# Patient Record
Sex: FEMALE | Race: WHITE | Employment: FULL TIME | URBAN - METROPOLITAN AREA
[De-identification: names, ages, dates, MRNs, and addresses within clinical notes are randomized per-mention and may not be internally consistent; named-entity substitution may affect disease eponyms.]

---

## 2022-02-25 ENCOUNTER — APPOINTMENT (OUTPATIENT)
Dept: ULTRASOUND IMAGING | Age: 37
End: 2022-02-25
Attending: PHYSICIAN ASSISTANT
Payer: MEDICAID

## 2022-02-25 ENCOUNTER — HOSPITAL ENCOUNTER (EMERGENCY)
Age: 37
Discharge: HOME | End: 2022-02-25
Attending: EMERGENCY MEDICINE
Payer: MEDICAID

## 2022-02-25 VITALS
BODY MASS INDEX: 30.46 KG/M2 | HEIGHT: 68 IN | OXYGEN SATURATION: 100 % | HEART RATE: 70 BPM | DIASTOLIC BLOOD PRESSURE: 64 MMHG | WEIGHT: 201 LBS | SYSTOLIC BLOOD PRESSURE: 102 MMHG | TEMPERATURE: 98.8 F | RESPIRATION RATE: 16 BRPM

## 2022-02-25 DIAGNOSIS — O20.0 THREATENED ABORTION: Primary | ICD-10-CM

## 2022-02-25 LAB
ABO + RH BLD: NORMAL
ALBUMIN SERPL-MCNC: 3.3 G/DL (ref 3.5–5)
ALBUMIN/GLOB SERPL: 0.8 {RATIO} (ref 1.2–3.5)
ALP SERPL-CCNC: 66 U/L (ref 50–130)
ALT SERPL-CCNC: 19 U/L (ref 12–65)
ANION GAP SERPL CALC-SCNC: 8 MMOL/L (ref 7–16)
AST SERPL-CCNC: 9 U/L (ref 15–37)
BACTERIA URNS QL MICRO: 0 /HPF
BASOPHILS # BLD: 0 K/UL (ref 0–0.2)
BASOPHILS NFR BLD: 0 % (ref 0–2)
BILIRUB SERPL-MCNC: 0.4 MG/DL (ref 0.2–1.1)
BUN SERPL-MCNC: 7 MG/DL (ref 6–23)
CALCIUM SERPL-MCNC: 8.9 MG/DL (ref 8.3–10.4)
CASTS URNS QL MICRO: 0 /LPF
CHLORIDE SERPL-SCNC: 105 MMOL/L (ref 98–107)
CO2 SERPL-SCNC: 25 MMOL/L (ref 21–32)
CREAT SERPL-MCNC: 0.66 MG/DL (ref 0.6–1)
DIFFERENTIAL METHOD BLD: ABNORMAL
EOSINOPHIL # BLD: 0.1 K/UL (ref 0–0.8)
EOSINOPHIL NFR BLD: 1 % (ref 0.5–7.8)
EPI CELLS #/AREA URNS HPF: NORMAL /HPF
ERYTHROCYTE [DISTWIDTH] IN BLOOD BY AUTOMATED COUNT: 12.9 % (ref 11.9–14.6)
GLOBULIN SER CALC-MCNC: 4 G/DL (ref 2.3–3.5)
GLUCOSE SERPL-MCNC: 88 MG/DL (ref 65–100)
HCG SERPL-ACNC: ABNORMAL MIU/ML (ref 0–6)
HCT VFR BLD AUTO: 38.7 % (ref 35.8–46.3)
HGB BLD-MCNC: 12.8 G/DL (ref 11.7–15.4)
IMM GRANULOCYTES # BLD AUTO: 0 K/UL (ref 0–0.5)
IMM GRANULOCYTES NFR BLD AUTO: 0 % (ref 0–5)
LYMPHOCYTES # BLD: 1.4 K/UL (ref 0.5–4.6)
LYMPHOCYTES NFR BLD: 17 % (ref 13–44)
MCH RBC QN AUTO: 30.5 PG (ref 26.1–32.9)
MCHC RBC AUTO-ENTMCNC: 33.1 G/DL (ref 31.4–35)
MCV RBC AUTO: 92.1 FL (ref 79.6–97.8)
MONOCYTES # BLD: 0.4 K/UL (ref 0.1–1.3)
MONOCYTES NFR BLD: 5 % (ref 4–12)
NEUTS SEG # BLD: 6.1 K/UL (ref 1.7–8.2)
NEUTS SEG NFR BLD: 77 % (ref 43–78)
NRBC # BLD: 0 K/UL (ref 0–0.2)
PLATELET # BLD AUTO: 287 K/UL (ref 150–450)
PMV BLD AUTO: 9.1 FL (ref 9.4–12.3)
POTASSIUM SERPL-SCNC: 3.5 MMOL/L (ref 3.5–5.1)
PROT SERPL-MCNC: 7.3 G/DL (ref 6.3–8.2)
RBC # BLD AUTO: 4.2 M/UL (ref 4.05–5.2)
RBC #/AREA URNS HPF: NORMAL /HPF
SERVICE CMNT-IMP: NORMAL
SODIUM SERPL-SCNC: 138 MMOL/L (ref 136–145)
WBC # BLD AUTO: 7.9 K/UL (ref 4.3–11.1)
WBC URNS QL MICRO: NORMAL /HPF
WET PREP GENITAL: NORMAL
WET PREP GENITAL: NORMAL

## 2022-02-25 PROCEDURE — 76817 TRANSVAGINAL US OBSTETRIC: CPT

## 2022-02-25 PROCEDURE — 87210 SMEAR WET MOUNT SALINE/INK: CPT

## 2022-02-25 PROCEDURE — 81015 MICROSCOPIC EXAM OF URINE: CPT

## 2022-02-25 PROCEDURE — 84702 CHORIONIC GONADOTROPIN TEST: CPT

## 2022-02-25 PROCEDURE — 85025 COMPLETE CBC W/AUTO DIFF WBC: CPT

## 2022-02-25 PROCEDURE — 80053 COMPREHEN METABOLIC PANEL: CPT

## 2022-02-25 PROCEDURE — 86900 BLOOD TYPING SEROLOGIC ABO: CPT

## 2022-02-25 PROCEDURE — 99284 EMERGENCY DEPT VISIT MOD MDM: CPT

## 2022-02-25 NOTE — DISCHARGE INSTRUCTIONS
Please follow-up with the OB/GYN, you will need a repeat quantitative hCG test in 2-3 days. Return for any emergent symptoms, severe worsening pelvic or abdominal pain, severe bleeding or any other emergent concerns.

## 2022-02-25 NOTE — ED PROVIDER NOTES
59-year-old female patient comes in today for evaluation of abnormal vaginal discharge which appears like \"old period blood\" For the past 2 days. Patient reports that she is approximately 9 weeks gestation with a twin pregnancy. She has not been seen by her OB/GYN yet but does have an appointment scheduled for next month. She also reports pressure in the right lower and right mid suprapubic region of her abdomen. She denies \"pain\" but says that it is mildly uncomfortable. She denies nausea vomiting or diarrhea or urinary symptoms. History reviewed. No pertinent past medical history. Past Surgical History:   Procedure Laterality Date    IR CHOLECYSTOSTOMY PERCUTANEOUS           History reviewed. No pertinent family history. Social History     Socioeconomic History    Marital status: SINGLE     Spouse name: Not on file    Number of children: Not on file    Years of education: Not on file    Highest education level: Not on file   Occupational History    Not on file   Tobacco Use    Smoking status: Not on file    Smokeless tobacco: Not on file   Substance and Sexual Activity    Alcohol use: Not on file    Drug use: Not on file    Sexual activity: Not on file   Other Topics Concern    Not on file   Social History Narrative    Not on file     Social Determinants of Health     Financial Resource Strain:     Difficulty of Paying Living Expenses: Not on file   Food Insecurity:     Worried About Running Out of Food in the Last Year: Not on file    Seth of Food in the Last Year: Not on file   Transportation Needs:     Lack of Transportation (Medical): Not on file    Lack of Transportation (Non-Medical):  Not on file   Physical Activity:     Days of Exercise per Week: Not on file    Minutes of Exercise per Session: Not on file   Stress:     Feeling of Stress : Not on file   Social Connections:     Frequency of Communication with Friends and Family: Not on file    Frequency of Social Gatherings with Friends and Family: Not on file    Attends [employer] Services: Not on file    Active Member of Clubs or Organizations: Not on file    Attends Club or Organization Meetings: Not on file    Marital Status: Not on file   Intimate Partner Violence:     Fear of Current or Ex-Partner: Not on file    Emotionally Abused: Not on file    Physically Abused: Not on file    Sexually Abused: Not on file   Housing Stability:     Unable to Pay for Housing in the Last Year: Not on file    Number of [place] in the Last Year: Not on file    Unstable Housing in the Last Year: Not on file         ALLERGIES: Latex    Review of Systems   Constitutional: Negative for chills and fever. Respiratory: Negative for cough and shortness of breath. Cardiovascular: Negative for chest pain. Gastrointestinal: Negative for abdominal pain, nausea and vomiting. Genitourinary: Positive for menstrual problem, vaginal bleeding and vaginal discharge. Negative for dysuria, frequency, urgency and vaginal pain. Musculoskeletal: Negative for back pain and myalgias. Skin: Negative for color change. All other systems reviewed and are negative. Vitals:    02/25/22 1317   BP: (!) 145/66   Pulse: 96   Resp: 18   Temp: 98.8 °F (37.1 °C)   SpO2: 99%   Weight: 91.2 kg (201 lb)   Height: 5' 8\" (1.727 m)            Physical Exam  Vitals and nursing note reviewed. Exam conducted with a chaperone present. Constitutional:       General: She is not in acute distress. Appearance: Normal appearance. She is not ill-appearing, toxic-appearing or diaphoretic. HENT:      Head: Normocephalic and atraumatic. Eyes:      Conjunctiva/sclera: Conjunctivae normal.   Cardiovascular:      Rate and Rhythm: Normal rate and regular rhythm. Pulses: Normal pulses. Pulmonary:      Effort: Pulmonary effort is normal. No respiratory distress. Breath sounds: Normal air entry.  No stridor, decreased air movement or transmitted upper airway sounds. No decreased breath sounds. Abdominal:      General: Abdomen is flat. Palpations: Abdomen is soft. Tenderness: There is no abdominal tenderness. There is no right CVA tenderness, left CVA tenderness, guarding or rebound. Genitourinary:     Vagina: Vaginal discharge present. No erythema, tenderness, bleeding or lesions. Cervix: Discharge present. No cervical motion tenderness, friability, lesion, erythema, cervical bleeding or eversion. Uterus: Normal.       Adnexa: Right adnexa normal and left adnexa normal.      Comments: White discharge present, no blood in the vaginal canal noted. Cervical os is closed. Nurse Jennifer chaperone present. Skin:     General: Skin is warm and dry. Neurological:      General: No focal deficit present. Mental Status: She is alert and oriented to person, place, and time. Mental status is at baseline. MDM  Number of Diagnoses or Management Options  Threatened : new and requires workup  Diagnosis management comments: 80-year-old female comes in with concern for a dark brown discharge in a small quantity which started yesterday. She is approximately 9-1/2 weeks gestation with a twin pregnancy. She has an appointment with her OB/GYN scheduled for .  Labs show normal WBC, H&H and platelets. Normal electrolytes, kidney function, liver function. Quantitative hCG is 175,628. I performed a wet prep given the vaginal discharge and this came back negative. Patient's urine negative for infection. OB ultrasound shows intrauterine twin gestations with positive fetal heart tones. Patient will be discharged and advised to follow-up closely with the OB/GYN. Will need a repeat quantitative hCG in 2 to 3 days. Advised to return for any emergent or severely worsening symptoms.     ED Course as of 22 1609      1601   IMPRESSION  Intrauterine dichorionic/diamniotic twin gestations with visualized fetal heart  rates, estimated at approximately 10 weeks.  [AR]      ED Course User Index  [AR] [facility]       Procedures

## 2022-02-25 NOTE — ED TRIAGE NOTES
Pt states she is about 9 weeks pregnant, states she started having a brown colored discharge yesterday. Also states she feels \"pressure\" in RLQ. Mask applied to pt.

## 2022-02-25 NOTE — ED NOTES
I have reviewed discharge instructions with the patient. The patient verbalized understanding. Patient left ED via Discharge Method: ambulatory to Home with self. Opportunity for questions and clarification provided. No acute distress present      Patient given 0 scripts. To continue your aftercare when you leave the hospital, you may receive an automated call from our care team to check in on how you are doing. This is a free service and part of our promise to provide the best care and service to meet your aftercare needs.  If you have questions, or wish to unsubscribe from this service please call [phone]. Thank you for Choosing our [facility] Emergency Department.

## 2022-03-09 PROBLEM — O20.0 THREATENED ABORTION: Status: RESOLVED | Noted: 2022-02-25 | Resolved: 2022-03-09

## 2022-03-09 PROBLEM — O99.331 SMOKING (TOBACCO) COMPLICATING PREGNANCY, FIRST TRIMESTER: Status: ACTIVE | Noted: 2022-03-09

## 2022-03-09 PROBLEM — O30.041 DICHORIONIC DIAMNIOTIC TWIN PREGNANCY IN FIRST TRIMESTER: Status: ACTIVE | Noted: 2022-03-09

## 2022-03-09 PROBLEM — O09.521 AMA (ADVANCED MATERNAL AGE) MULTIGRAVIDA 35+, FIRST TRIMESTER: Status: ACTIVE | Noted: 2022-03-09

## 2022-03-18 PROBLEM — O99.331 SMOKING (TOBACCO) COMPLICATING PREGNANCY, FIRST TRIMESTER: Status: ACTIVE | Noted: 2022-03-09

## 2022-03-19 PROBLEM — O09.521 AMA (ADVANCED MATERNAL AGE) MULTIGRAVIDA 35+, FIRST TRIMESTER: Status: ACTIVE | Noted: 2022-03-09

## 2022-03-19 PROBLEM — O30.041 DICHORIONIC DIAMNIOTIC TWIN PREGNANCY IN FIRST TRIMESTER: Status: ACTIVE | Noted: 2022-03-09

## 2022-03-21 PROBLEM — O99.211 OBESITY AFFECTING PREGNANCY IN FIRST TRIMESTER: Status: ACTIVE | Noted: 2022-03-21

## 2022-03-23 ENCOUNTER — APPOINTMENT (OUTPATIENT)
Dept: GENERAL RADIOLOGY | Age: 37
End: 2022-03-23
Attending: PHYSICIAN ASSISTANT
Payer: COMMERCIAL

## 2022-03-23 ENCOUNTER — HOSPITAL ENCOUNTER (EMERGENCY)
Age: 37
Discharge: HOME | End: 2022-03-23
Attending: EMERGENCY MEDICINE
Payer: COMMERCIAL

## 2022-03-23 VITALS
SYSTOLIC BLOOD PRESSURE: 148 MMHG | HEIGHT: 67 IN | BODY MASS INDEX: 31.86 KG/M2 | RESPIRATION RATE: 16 BRPM | DIASTOLIC BLOOD PRESSURE: 97 MMHG | HEART RATE: 77 BPM | TEMPERATURE: 98.9 F | OXYGEN SATURATION: 99 % | WEIGHT: 203 LBS

## 2022-03-23 DIAGNOSIS — S93.401A SPRAIN OF RIGHT ANKLE, UNSPECIFIED LIGAMENT, INITIAL ENCOUNTER: Primary | ICD-10-CM

## 2022-03-23 PROBLEM — F41.9 ANXIETY: Status: ACTIVE | Noted: 2022-03-23

## 2022-03-23 PROBLEM — O99.340 ANXIETY DURING PREGNANCY: Status: ACTIVE | Noted: 2022-03-23

## 2022-03-23 PROCEDURE — 73610 X-RAY EXAM OF ANKLE: CPT

## 2022-03-23 PROCEDURE — 99283 EMERGENCY DEPT VISIT LOW MDM: CPT

## 2022-03-23 NOTE — DISCHARGE INSTRUCTIONS
As we discussed, your x-ray is negative for any acute fractures. Continue to use ice, compression with an Ace bandage, elevate the extremity. You may use Tylenol as needed for pain. If pain is no better in 2 to 3 weeks, follow-up with orthopedics. Their information is on your discharge paperwork.

## 2022-03-23 NOTE — ED PROVIDER NOTES
44-year-old female, 13 weeks pregnant presents with right ankle pain. She states while stepping off of a curb in [place], she rolled her ankle. She went to her OB office who referred her to the ED for an x-ray of her right ankle. She is able to bear some weight however with significant amount of pain. Pain radiates up to her proximal and mid shin. Associate with significant bruising and swelling of the right ankle. She is taken Tylenol and ice, Ace wrap with some improvement of her symptoms.   No other medical complaints           Past Medical History:   Diagnosis Date    Hypothyroidism     in 2000 was on synthroid was told it was stress induces        Past Surgical History:   Procedure Laterality Date    HX CHOLECYSTECTOMY      IR CHOLECYSTOSTOMY PERCUTANEOUS           Family History:   Problem Relation Age of Onset    Diabetes Mother     Diabetes Father     Cancer Maternal Grandmother     Mult Sclerosis Paternal Grandmother     Breast Cancer Neg Hx     Ovarian Cancer Neg Hx     Uterine Cancer Neg Hx     Colon Cancer Neg Hx        Social History     Socioeconomic History    Marital status:      Spouse name: Not on file    Number of children: Not on file    Years of education: Not on file    Highest education level: Not on file   Occupational History    Not on file   Tobacco Use    Smoking status: Current Every Day Smoker     Packs/day: 0.25     Types: Cigarettes    Smokeless tobacco: Never Used    Tobacco comment: patient is vaping daily    Vaping Use    Vaping Use: Every day    Substances: Nicotine   Substance and Sexual Activity    Alcohol use: Not Currently    Drug use: Not Currently    Sexual activity: Yes     Birth control/protection: None   Other Topics Concern    Not on file   Social History Narrative    Abuse: Feels safe at home, no history of physical abuse, no history of sexual abuse      Social Determinants of Health     Financial Resource Strain:     Difficulty of Paying Living Expenses: Not on file   Food Insecurity:     Worried About [address] in the Last Year: Not on file    Ran Out of Food in the Last Year: Not on file   Transportation Needs:     Lack of Transportation (Medical): Not on file    Lack of Transportation (Non-Medical): Not on file   Physical Activity:     Days of Exercise per Week: Not on file    Minutes of Exercise per Session: Not on file   Stress:     Feeling of Stress : Not on file   Social Connections:     Frequency of Communication with Friends and Family: Not on file    Frequency of Social Gatherings with Friends and Family: Not on file    Attends [employer] Services: Not on file    Active Member of [address] [place] [address] or Organizations: Not on file    Attends Club or Organization Meetings: Not on file    Marital Status: Not on file   Intimate Partner Violence:     Fear of Current or Ex-Partner: Not on file    Emotionally Abused: Not on file    Physically Abused: Not on file    Sexually Abused: Not on file   Housing Stability:     Unable to Pay for Housing in the Last Year: Not on file    Number of [place] in the Last Year: Not on file    Unstable Housing in the Last Year: Not on file         ALLERGIES: Latex, Banana, and Josue    Review of Systems   Constitutional: Negative for chills and fever. HENT: Negative for sore throat. Respiratory: Negative for cough. Gastrointestinal: Negative for nausea and vomiting. Musculoskeletal: Positive for arthralgias. Neurological: Negative for dizziness. Psychiatric/Behavioral: Negative for agitation. All other systems reviewed and are negative. Vitals:    03/23/22 1635   BP: (!) 148/97   Pulse: 77   Resp: 16   Temp: 98.9 °F (37.2 °C)   SpO2: 99%   Weight: 92.1 kg (203 lb)   Height: 5' 7\" (1.702 m)            Physical Exam  Vitals and nursing note reviewed. Constitutional:       General: She is not in acute distress. Appearance: Normal appearance.  She is normal weight. She is not ill-appearing, toxic-appearing or diaphoretic. HENT:      Head: Normocephalic and atraumatic. Nose: Nose normal.      Mouth/Throat:      Mouth: Mucous membranes are moist.   Eyes:      Pupils: Pupils are equal, round, and reactive to light. Cardiovascular:      Pulses: Normal pulses. Abdominal:      Palpations: Abdomen is soft. Musculoskeletal:      Right ankle: No swelling, deformity or ecchymosis. Tenderness present. Left ankle: Normal.   Neurological:      Mental Status: She is alert. MDM  Number of Diagnoses or Management Options  Sprain of right ankle, unspecified ligament, initial encounter  Diagnosis management comments: Patient presented with right ankle pain after stepping off a curb wrong yesterday. She is 15 weeks pregnant. Use some ice at home however was referred here by her OB/GYN for x-ray. X-ray obtained here in department was negative for any visualized fracture. Advised rest, ice, compression and elevation at home. Patient verbalized understanding. Voice dictation software was used during the making of this note. This software is not perfect and grammatical and other typographical errors may be present. This note has been proofread, but may still contain errors.    Birgit Hall PA-C        Amount and/or Complexity of Data Reviewed  Tests in the radiology section of CPT®: ordered and reviewed    Risk of Complications, Morbidity, and/or Mortality  Presenting problems: low  Diagnostic procedures: low  Management options: low    Patient Progress  Patient progress: stable         Procedures

## 2022-03-23 NOTE — ED TRIAGE NOTES
Pt presents to ED c/o right ankle pain and swelling. Pt states she rolled her ankle yesterday. No deformities noted upon triage exam. Pt is 13 weeks pregnant. Masked.

## 2022-03-23 NOTE — ED NOTES
I have reviewed discharge instructions with the patient. The patient verbalized understanding. Patient left ED via Discharge Method: ambulatory to Home with . Opportunity for questions and clarification provided. Patient given 0 scripts. To continue your aftercare when you leave the hospital, you may receive an automated call from our care team to check in on how you are doing. This is a free service and part of our promise to provide the best care and service to meet your aftercare needs.  If you have questions, or wish to unsubscribe from this service please call [phone]. Thank you for Choosing our [facility] Emergency Department.

## 2022-03-24 PROBLEM — O99.340 ANXIETY DURING PREGNANCY: Status: ACTIVE | Noted: 2022-03-23

## 2022-03-24 PROBLEM — F41.9 ANXIETY: Status: ACTIVE | Noted: 2022-03-23

## 2022-03-24 PROBLEM — F41.9 ANXIETY DURING PREGNANCY: Status: ACTIVE | Noted: 2022-03-23

## 2022-03-24 PROBLEM — O99.211 OBESITY AFFECTING PREGNANCY IN FIRST TRIMESTER: Status: ACTIVE | Noted: 2022-03-21

## 2022-04-07 PROBLEM — O20.9 VAGINAL BLEEDING AFFECTING EARLY PREGNANCY: Status: ACTIVE | Noted: 2022-04-07

## 2022-04-07 PROBLEM — O30.042 DICHORIONIC DIAMNIOTIC TWIN PREGNANCY IN SECOND TRIMESTER: Status: ACTIVE | Noted: 2022-03-09

## 2022-04-11 PROBLEM — O20.9 VAGINAL BLEEDING AFFECTING EARLY PREGNANCY: Status: ACTIVE | Noted: 2022-04-07

## 2022-04-11 PROBLEM — O30.042 DICHORIONIC DIAMNIOTIC TWIN PREGNANCY IN SECOND TRIMESTER: Status: ACTIVE | Noted: 2022-03-09

## 2022-04-18 PROBLEM — O09.521 AMA (ADVANCED MATERNAL AGE) MULTIGRAVIDA 35+, FIRST TRIMESTER: Status: RESOLVED | Noted: 2022-03-09 | Resolved: 2022-04-18

## 2022-04-19 PROBLEM — O99.332 SMOKING (TOBACCO) COMPLICATING PREGNANCY, SECOND TRIMESTER: Status: ACTIVE | Noted: 2022-03-09

## 2022-04-19 PROBLEM — O09.522 AMA (ADVANCED MATERNAL AGE) MULTIGRAVIDA 35+, SECOND TRIMESTER: Status: ACTIVE | Noted: 2022-03-09

## 2022-04-19 PROBLEM — O99.212 OBESITY AFFECTING PREGNANCY IN SECOND TRIMESTER: Status: ACTIVE | Noted: 2022-03-21

## 2022-04-23 ENCOUNTER — HOSPITAL ENCOUNTER (EMERGENCY)
Age: 37
Discharge: HOME | End: 2022-04-23
Attending: OBSTETRICS & GYNECOLOGY | Admitting: OBSTETRICS & GYNECOLOGY
Payer: COMMERCIAL

## 2022-04-23 ENCOUNTER — HOSPITAL ENCOUNTER (OUTPATIENT)
Age: 37
Setting detail: OBSERVATION
Discharge: HOME | End: 2022-04-26
Attending: OBSTETRICS & GYNECOLOGY | Admitting: OBSTETRICS & GYNECOLOGY
Payer: COMMERCIAL

## 2022-04-23 VITALS
WEIGHT: 201 LBS | RESPIRATION RATE: 18 BRPM | BODY MASS INDEX: 30.46 KG/M2 | TEMPERATURE: 98.4 F | HEIGHT: 68 IN | HEART RATE: 86 BPM | OXYGEN SATURATION: 98 % | SYSTOLIC BLOOD PRESSURE: 92 MMHG | DIASTOLIC BLOOD PRESSURE: 52 MMHG

## 2022-04-23 DIAGNOSIS — O09.522 AMA (ADVANCED MATERNAL AGE) MULTIGRAVIDA 35+, SECOND TRIMESTER: ICD-10-CM

## 2022-04-23 DIAGNOSIS — O30.042 DICHORIONIC DIAMNIOTIC TWIN PREGNANCY IN SECOND TRIMESTER: ICD-10-CM

## 2022-04-23 DIAGNOSIS — O46.92 VAGINAL BLEEDING IN PREGNANCY, SECOND TRIMESTER: ICD-10-CM

## 2022-04-23 PROBLEM — K59.00 CONSTIPATION DURING PREGNANCY IN SECOND TRIMESTER: Status: ACTIVE | Noted: 2022-04-23

## 2022-04-23 PROBLEM — O99.612 CONSTIPATION DURING PREGNANCY IN SECOND TRIMESTER: Status: ACTIVE | Noted: 2022-04-23

## 2022-04-23 LAB
ABO + RH BLD: NORMAL
ANION GAP SERPL CALC-SCNC: 8 MMOL/L (ref 7–16)
APTT PPP: 25.8 SEC (ref 24.1–35.1)
BASOPHILS # BLD: 0 K/UL (ref 0–0.2)
BASOPHILS # BLD: 0 K/UL (ref 0–0.2)
BASOPHILS NFR BLD: 0 % (ref 0–2)
BASOPHILS NFR BLD: 0 % (ref 0–2)
BUN SERPL-MCNC: 8 MG/DL (ref 6–23)
CALCIUM SERPL-MCNC: 8.7 MG/DL (ref 8.3–10.4)
CHLORIDE SERPL-SCNC: 109 MMOL/L (ref 98–107)
CO2 SERPL-SCNC: 24 MMOL/L (ref 21–32)
CREAT SERPL-MCNC: 0.53 MG/DL (ref 0.6–1)
DIFFERENTIAL METHOD BLD: ABNORMAL
DIFFERENTIAL METHOD BLD: ABNORMAL
EOSINOPHIL # BLD: 0.2 K/UL (ref 0–0.8)
EOSINOPHIL # BLD: 0.2 K/UL (ref 0–0.8)
EOSINOPHIL NFR BLD: 2 % (ref 0.5–7.8)
EOSINOPHIL NFR BLD: 2 % (ref 0.5–7.8)
ERYTHROCYTE [DISTWIDTH] IN BLOOD BY AUTOMATED COUNT: 12.9 % (ref 11.9–14.6)
ERYTHROCYTE [DISTWIDTH] IN BLOOD BY AUTOMATED COUNT: 12.9 % (ref 11.9–14.6)
FIBRINOGEN PPP-MCNC: 388 MG/DL (ref 190–501)
GLUCOSE SERPL-MCNC: 92 MG/DL (ref 65–100)
HCG SERPL-ACNC: ABNORMAL MIU/ML (ref 0–6)
HCT VFR BLD AUTO: 32 % (ref 35.8–46.3)
HCT VFR BLD AUTO: 34.2 % (ref 35.8–46.3)
HGB BLD-MCNC: 10.8 G/DL (ref 11.7–15.4)
HGB BLD-MCNC: 11.5 G/DL (ref 11.7–15.4)
IMM GRANULOCYTES # BLD AUTO: 0.1 K/UL (ref 0–0.5)
IMM GRANULOCYTES # BLD AUTO: 0.1 K/UL (ref 0–0.5)
IMM GRANULOCYTES NFR BLD AUTO: 0 % (ref 0–5)
IMM GRANULOCYTES NFR BLD AUTO: 1 % (ref 0–5)
INR PPP: 0.9
LYMPHOCYTES # BLD: 1.5 K/UL (ref 0.5–4.6)
LYMPHOCYTES # BLD: 2.3 K/UL (ref 0.5–4.6)
LYMPHOCYTES NFR BLD: 14 % (ref 13–44)
LYMPHOCYTES NFR BLD: 20 % (ref 13–44)
MCH RBC QN AUTO: 31.2 PG (ref 26.1–32.9)
MCH RBC QN AUTO: 31.3 PG (ref 26.1–32.9)
MCHC RBC AUTO-ENTMCNC: 33.6 G/DL (ref 31.4–35)
MCHC RBC AUTO-ENTMCNC: 33.8 G/DL (ref 31.4–35)
MCV RBC AUTO: 92.5 FL (ref 79.6–97.8)
MCV RBC AUTO: 92.9 FL (ref 79.6–97.8)
MONOCYTES # BLD: 0.7 K/UL (ref 0.1–1.3)
MONOCYTES # BLD: 0.7 K/UL (ref 0.1–1.3)
MONOCYTES NFR BLD: 6 % (ref 4–12)
MONOCYTES NFR BLD: 7 % (ref 4–12)
NEUTS SEG # BLD: 8 K/UL (ref 1.7–8.2)
NEUTS SEG # BLD: 8.1 K/UL (ref 1.7–8.2)
NEUTS SEG NFR BLD: 71 % (ref 43–78)
NEUTS SEG NFR BLD: 77 % (ref 43–78)
NRBC # BLD: 0 K/UL (ref 0–0.2)
NRBC # BLD: 0 K/UL (ref 0–0.2)
PLATELET # BLD AUTO: 291 K/UL (ref 150–450)
PLATELET # BLD AUTO: 295 K/UL (ref 150–450)
PMV BLD AUTO: 8.9 FL (ref 9.4–12.3)
PMV BLD AUTO: 9.1 FL (ref 9.4–12.3)
POTASSIUM SERPL-SCNC: 3.8 MMOL/L (ref 3.5–5.1)
PROTHROMBIN TIME: 12.5 SEC (ref 12.6–14.5)
RBC # BLD AUTO: 3.46 M/UL (ref 4.05–5.2)
RBC # BLD AUTO: 3.68 M/UL (ref 4.05–5.2)
SODIUM SERPL-SCNC: 141 MMOL/L (ref 136–145)
WBC # BLD AUTO: 10.6 K/UL (ref 4.3–11.1)
WBC # BLD AUTO: 11.2 K/UL (ref 4.3–11.1)

## 2022-04-23 PROCEDURE — 76817 TRANSVAGINAL US OBSTETRIC: CPT

## 2022-04-23 PROCEDURE — 80048 BASIC METABOLIC PNL TOTAL CA: CPT

## 2022-04-23 PROCEDURE — 76815 OB US LIMITED FETUS(S): CPT

## 2022-04-23 PROCEDURE — 85384 FIBRINOGEN ACTIVITY: CPT

## 2022-04-23 PROCEDURE — 85730 THROMBOPLASTIN TIME PARTIAL: CPT

## 2022-04-23 PROCEDURE — 99285 EMERGENCY DEPT VISIT HI MDM: CPT

## 2022-04-23 PROCEDURE — 86900 BLOOD TYPING SEROLOGIC ABO: CPT

## 2022-04-23 PROCEDURE — 87491 CHLMYD TRACH DNA AMP PROBE: CPT

## 2022-04-23 PROCEDURE — 85025 COMPLETE CBC W/AUTO DIFF WBC: CPT

## 2022-04-23 PROCEDURE — 75810000275 HC EMERGENCY DEPT VISIT NO LEVEL OF CARE

## 2022-04-23 PROCEDURE — 85610 PROTHROMBIN TIME: CPT

## 2022-04-23 PROCEDURE — G0378 HOSPITAL OBSERVATION PER HR: HCPCS

## 2022-04-23 PROCEDURE — 99283 EMERGENCY DEPT VISIT LOW MDM: CPT

## 2022-04-23 PROCEDURE — 84702 CHORIONIC GONADOTROPIN TEST: CPT

## 2022-04-23 RX ORDER — SODIUM CHLORIDE 0.9 % (FLUSH) 0.9 %
5-10 SYRINGE (ML) INJECTION AS NEEDED
Status: DISCONTINUED | OUTPATIENT
Start: 2022-04-23 | End: 2022-04-23 | Stop reason: HOSPADM

## 2022-04-23 RX ORDER — POLYETHYLENE GLYCOL 3350 17 G/17G
17 POWDER, FOR SOLUTION ORAL 2 TIMES DAILY
Qty: 1 EACH | Refills: 2 | Status: SHIPPED | OUTPATIENT
Start: 2022-04-23

## 2022-04-23 RX ORDER — ACETAMINOPHEN 325 MG/1
650 TABLET ORAL
Status: DISCONTINUED | OUTPATIENT
Start: 2022-04-23 | End: 2022-04-26 | Stop reason: HOSPADM

## 2022-04-23 RX ORDER — SODIUM CHLORIDE 0.9 % (FLUSH) 0.9 %
5-40 SYRINGE (ML) INJECTION EVERY 8 HOURS
Status: DISCONTINUED | OUTPATIENT
Start: 2022-04-23 | End: 2022-04-26 | Stop reason: HOSPADM

## 2022-04-23 RX ORDER — SODIUM CHLORIDE 0.9 % (FLUSH) 0.9 %
5-10 SYRINGE (ML) INJECTION EVERY 8 HOURS
Status: DISCONTINUED | OUTPATIENT
Start: 2022-04-23 | End: 2022-04-23 | Stop reason: HOSPADM

## 2022-04-23 RX ORDER — SODIUM CHLORIDE 0.9 % (FLUSH) 0.9 %
5-40 SYRINGE (ML) INJECTION AS NEEDED
Status: DISCONTINUED | OUTPATIENT
Start: 2022-04-23 | End: 2022-04-26 | Stop reason: HOSPADM

## 2022-04-23 RX ORDER — GLUCOSAMINE SULFATE 1500 MG
POWDER IN PACKET (EA) ORAL DAILY
COMMUNITY

## 2022-04-23 NOTE — ED TRIAGE NOTES
Pt c/o waking up this morning with small amount of vag bleed. Pt is 18 weeks pregnant with twins. 1 previous pregnancy 9 years ago without complication, full term, vag delivery.

## 2022-04-23 NOTE — DISCHARGE INSTRUCTIONS
Patient Education      Patient Education   Patient Education   Colace daily   Miralax daily     Pregnancy Precautions: Care Instructions  Your Care Instructions     There is no sure way to prevent labor before your due date ( labor) or to prevent most other pregnancy problems. But there are things you can do to increase your chances of a healthy pregnancy. Go to your appointments, follow your doctor's advice, and take good care of yourself. Eat well, and exercise (if your doctor agrees). And make sure to drink plenty of water. Follow-up care is a key part of your treatment and safety. Be sure to make and go to all appointments, and call your doctor if you are having problems. It's also a good idea to know your test results and keep a list of the medicines you take. How can you care for yourself at home? · Make sure you go to your prenatal appointments. At each visit, your doctor will check your blood pressure. Your doctor will also check to see if you have protein in your urine. High blood pressure and protein in urine are signs of preeclampsia. This condition can be dangerous for you and your baby. · Drink plenty of fluids. Dehydration can cause contractions. If you have kidney, heart, or liver disease and have to limit fluids, talk with your doctor before you increase the amount of fluids you drink. · Tell your doctor right away if you notice any symptoms of an infection, such as:  ? Burning when you urinate. ? A foul-smelling discharge from your vagina. ? Vaginal itching. ? Unexplained fever. ? Unusual pain or soreness in your uterus or lower belly. · Eat a balanced diet. Include plenty of foods that are high in calcium and iron. ? Foods high in calcium include milk, cheese, yogurt, almonds, and broccoli. ? Foods high in iron include red meat, shellfish, poultry, eggs, beans, raisins, whole-grain bread, and leafy green vegetables. · Do not smoke.  If you need help quitting, talk to your doctor about stop-smoking programs and medicines. These can increase your chances of quitting for good. · Do not drink alcohol or use marijuana or illegal drugs. · Follow your doctor's directions about activity. Your doctor will let you know how much, if any, exercise you can do. · Ask your doctor if you can have sex. If you are at risk for early labor, your doctor may ask you to not have sex. · Take care to prevent falls. During pregnancy, your joints are loose, and your balance is off. Sports such as bicycling, skiing, or in-line skating can increase your risk of falling. And don't ride horses or motorcycles, dive, water ski, scuba dive, or parachute jump while you are pregnant. · Avoid things that can make your body too hot and may be harmful to your baby, such as a hot tub or sauna. Or talk with your doctor before doing anything that raises your body temperature. Your doctor can tell you if it's safe. · Do not take any over-the-counter or herbal medicines or supplements without talking to your doctor or pharmacist first.  When should you call for help? Call 911  anytime you think you may need emergency care. For example, call if:    · You passed out (lost consciousness).     · You have a seizure.     · You have severe vaginal bleeding.     · You have severe pain in your belly or pelvis.     · You have had fluid gushing or leaking from your vagina and you know or think the umbilical cord is bulging into your vagina. If this happens, immediately get down on your knees so your rear end (buttocks) is higher than your head. This will decrease the pressure on the cord until help arrives. Call your doctor now or seek immediate medical care if:    · You have signs of preeclampsia, such as:  ? Sudden swelling of your face, hands, or feet. ? New vision problems (such as dimness, blurring, or seeing spots).   ? A severe headache.     · You have any vaginal bleeding.     · You have belly pain or cramping.     · You have a fever.     · You have had regular contractions (with or without pain) for an hour. This means that you have 8 or more within 1 hour or 4 or more in 20 minutes after you change your position and drink fluids.     · You have a sudden release of fluid from your vagina.     · You have low back pain or pelvic pressure that does not go away.     · You notice that your baby has stopped moving or is moving much less than normal.   Watch closely for changes in your health, and be sure to contact your doctor if you have any problems. Where can you learn more? Go to http://www.maxwell.com/  Enter Y951 in the search box to learn more about \"Pregnancy Precautions: Care Instructions. \"  Current as of: June 16, 2021               Content Version: 13.2  © 2006-2022 [employer]. Care instructions adapted under license by [employer] (which disclaims liability or warranty for this information). If you have questions about a medical condition or this instruction, always ask your healthcare professional. [place] [address] any warranty or liability for your use of this information. Weeks 18 to 22 of Your Pregnancy: Care Instructions  Overview     Your baby is continuing to develop quickly. Sometime between 18 and 22 weeks, you'll probably start to feel your baby move. At first, these small fetal movements feel like fluttering or \"butterflies. \" Or they may feel like gas bubbles. As your baby grows, these movements will become stronger. You may also notice that your baby hiccups. Babies at this stage can now suck their thumbs. You may find that your nausea and fatigue are gone. You may feel better overall and have more energy than you did in your first trimester. But you might now also have some new discomforts, like sleep problems or leg cramps. Talk to your doctor about things you can do at home to ease these problems.   Follow-up care is a key part of your treatment and safety. Be sure to make and go to all appointments, and call your doctor if you are having problems. It's also a good idea to know your test results and keep a list of the medicines you take. How can you care for yourself at home? Ease sleep problems  · Avoid caffeine in drinks or chocolate late in the day. · Get some exercise every day. · Take a warm shower or bath before bed. · Have a light snack or glass of milk at bedtime. · Do relaxation exercises in bed to calm your mind and body. · Support your legs and back with extra pillows. Try a pillow between your legs if you sleep on your side. · Do not use sleeping pills or alcohol. They could harm your baby. Ease leg cramps  · Do not massage your calf during the cramp. · Sit on a firm bed or chair. Straighten your leg, and bend your foot (flex your ankle) slowly upward, toward your knee. Bend your toes up and down. · Stand on a cool, flat surface. Stretch your toes upward, and take small steps walking on your heels. · Use a heating pad or hot water bottle to help with muscle ache. Prevent leg cramps  · Be sure to get enough calcium. If you are worried that you are not getting enough, talk to your doctor. · Exercise every day, and stretch your legs before bed. · Take a warm bath before bed, and try leg warmers at night. Where can you learn more? Go to http://www.gray.com/  Enter I101 in the search box to learn more about \"Weeks 18 to 22 of Your Pregnancy: Care Instructions. \"  Current as of: June 16, 2021               Content Version: 13.2  © 6794-8322 [employer]. Care instructions adapted under license by [employer] (which disclaims liability or warranty for this information). If you have questions about a medical condition or this instruction, always ask your healthcare professional. [address] any warranty or liability for your use of this information. Constipation: Care Instructions  Overview     Constipation means that you have a hard time passing stools (bowel movements). People pass stools from 3 times a day to once every 3 days. What is normal for you may be different. Constipation may occur with pain in the rectum and cramping. The pain may get worse when you try to pass stools. Sometimes there are small amounts of bright red blood on toilet paper or the surface of stools. This is because of enlarged veins near the rectum (hemorrhoids). A few changes in your diet and lifestyle may help you avoid ongoing constipation. Your doctor may also prescribe medicine to help loosen your stool. Some medicines can cause constipation. These include pain medicines and antidepressants. Tell your doctor about all the medicines you take. Your doctor may want to make a medicine change to ease your symptoms. Follow-up care is a key part of your treatment and safety. Be sure to make and go to all appointments, and call your doctor if you are having problems. It's also a good idea to know your test results and keep a list of the medicines you take. How can you care for yourself at home? · Drink plenty of fluids. If you have kidney, heart, or liver disease and have to limit fluids, talk with your doctor before you increase the amount of fluids you drink. · Include high-fiber foods in your diet each day. These include fruits, vegetables, beans, and whole grains. · Get at least 30 minutes of exercise on most days of the week. Walking is a good choice. You also may want to do other activities, such as running, swimming, cycling, or playing tennis or team sports. · Take a fiber supplement, such as Citrucel or Metamucil, every day. Read and follow all instructions on the label. · Schedule time each day for a bowel movement. A daily routine may help. Take your time having a bowel movement, but don't sit for more than 10 minutes at a time.  And don't strain too much.  · Support your feet with a small step stool when you sit on the toilet. This helps flex your hips and places your pelvis in a squatting position. · Your doctor may recommend an over-the-counter laxative to relieve your constipation. Examples are Milk of Magnesia and MiraLax. Read and follow all instructions on the label. Do not use laxatives on a long-term basis. When should you call for help? Call your doctor now or seek immediate medical care if:    · You have new or worse belly pain.     · You have new or worse nausea or vomiting.     · You have blood in your stools. Watch closely for changes in your health, and be sure to contact your doctor if:    · Your constipation is getting worse.     · You do not get better as expected. Where can you learn more? Go to http://www.maxwell.com/  Enter P343 in the search box to learn more about \"Constipation: Care Instructions. \"  Current as of: July 1, 2021               Content Version: 13.2  © 2006-2022 [employer]. Care instructions adapted under license by [employer] (which disclaims liability or warranty for this information). If you have questions about a medical condition or this instruction, always ask your healthcare professional. [place] [address] any warranty or liability for your use of this information.

## 2022-04-23 NOTE — H&P
History & Physical    Name: Ana Peña MRN: [id number]  SSN: [id number]    YOB: 1985  Age: 40 y.o. Sex: female      Subjective: 39 yo  at 17w5d presents to ED with complaint of Vb. PNC with [employer] complicated by AMA, di/di twin gestation, smoking and maternal obesity. Reason for Admission:  Pregnancy and VB    History of Present Illness: Ms. Janice Mckeon is a 40 y.o.  female with an estimated gestational age of 17w9d with Estimated Date of Delivery: 22. Patient complains of vaginal bleeding at 0700 today that she noted after voiding. She had brown blood on the tissue and a small amount on her underwear. She denies any recent trauma, denies any sexual intercourse in the past few days. States normal fetal movement, has felt occasional pelvic pressure. Pregnancy has been complicated by advanced maternal age and di/di twins, VB in first trimester. Patient denies abdominal pain  , chest pain, contractions, fever, headache , nausea and vomiting, right upper quadrant pain  , shortness of breath, swelling, vaginal leaking of fluid , visual disturbances and back pain and dysuria.     OB History    Para Term  AB Living   2 1 1     1   SAB IAB Ectopic Molar Multiple Live Births             1      # Outcome Date GA Lbr Michael/2nd Weight Sex Delivery Anes PTL Lv   2 Current            1 Term 12 40w2d  3.232 kg F Vag-Spont  N WES     Past Medical History:   Diagnosis Date    Hypothyroidism     in  was on synthroid was told it was stress induces      Past Surgical History:   Procedure Laterality Date    HX CHOLECYSTECTOMY      IR CHOLECYSTOSTOMY PERCUTANEOUS       Social History     Occupational History    Not on file   Tobacco Use    Smoking status: Current Every Day Smoker     Packs/day: 0.25     Types: Cigarettes    Smokeless tobacco: Never Used    Tobacco comment: patient is vaping daily    Vaping Use    Vaping Use: Every day    Substances: Nicotine   Substance and Sexual Activity    Alcohol use: Not Currently    Drug use: Not Currently    Sexual activity: Yes     Birth control/protection: None      Family History   Problem Relation Age of Onset    Diabetes Mother     Stroke Mother     Diabetes Father     Cancer Maternal Grandmother     Mult Sclerosis Paternal Grandmother     Diabetes Brother     Breast Cancer Neg Hx     Ovarian Cancer Neg Hx     Uterine Cancer Neg Hx     Colon Cancer Neg Hx        Allergies   Allergen Reactions    Latex Rash    Banana Itching    Josue Rash and Itching     Prior to Admission medications    Medication Sig Start Date End Date Taking? Authorizing Provider   cholecalciferol (Vitamin D3) 25 mcg (1,000 unit) cap Take  by mouth daily. Yes Provider, Historical   docusate sodium (COLACE) 50 mg capsule Take 1 Capsule by mouth two (2) times a day for 90 days. 4/23/22 7/22/22 Yes Lidya Dasilva MD   polyethylene glycol [facility]) 17 gram packet Take 1 Packet by mouth two (2) times a day. 4/23/22  Yes Lidya Dasilva MD   folic acid (FOLVITE) 130 mcg tablet Take 800 mcg by mouth daily. Yes Provider, Historical   prenatal vits62/FA/om3/dha/epa (PRENATAL GUMMY PO) Take  by mouth. Yes Provider, Historical   sertraline (ZOLOFT) 100 mg tablet Take 1 Tablet by mouth daily. 4/19/22  Yes Leah Smith MD   calcium carbonate/vitamin D3 (CALCIUM+D PO) Take  by mouth. Yes Provider, Historical   fexofenadine (ALLEGRA) 180 mg tablet Take  by mouth. Yes Provider, Historical   aspirin delayed-release 81 mg tablet Take 2 Tablets by mouth daily. 3/14/22  Yes Vania Peterson NP        Review of Systems:  A comprehensive review of systems was negative except for that written in the History of Present Illness.      Objective:     Vitals:    Vitals:    04/23/22 0713 04/23/22 0740   BP: 113/66 (!) 92/52   Pulse:  86   Resp: 20 18   Temp: 98.1 °F (36.7 °C) 98.4 °F (36.9 °C)   SpO2: 98%    Weight: 91.2 kg (201 lb)    Height: 5' 8\" (1.727 m)       Temp (24hrs), Av.3 °F (36.8 °C), Min:98.1 °F (36.7 °C), Max:98.4 °F (36.9 °C)    BP  Min: 92/52  Max: 113/66     Physical Exam:  Constitutional: The patient appears well, alert, oriented x 3. Cardiovascular: Heart RRR, no murmurs. Respiratory: Lungs clear, no respiratory distress  GI: Abdomen soft, nontender, no guarding  No fundal tenderness  Musculoskeletal: no cva tenderness  Upper ext: no edema, reflexes +2  Lower ext: no edema, neg jaydon's, reflexes +2  Skin: no rashes or lesions  Psychiatric:Mood/ Affect: appropriate  Genitourinary: SVE: cervical length 3.66-4.22 cm on TVUS  no funneling, no change with pressure  FHT: fetus A 152 bpm, fetus B 144 bpm by us  Bedside ultrasound- breech/breech, dividing membrane seen, good movement seen in both fetus A and fetus B  Membranes:  Intact  Uterine Activity:  None       Lab/Data Review:  Recent Results (from the past 24 hour(s))   CBC WITH AUTOMATED DIFF    Collection Time: 22  7:23 AM   Result Value Ref Range    WBC 10.6 4.3 - 11.1 K/uL    RBC 3.68 (L) 4.05 - 5.2 M/uL    HGB 11.5 (L) 11.7 - 15.4 g/dL    HCT 34.2 (L) 35.8 - 46.3 %    MCV 92.9 79.6 - 97.8 FL    MCH 31.3 26.1 - 32.9 PG    MCHC 33.6 31.4 - 35.0 g/dL    RDW 12.9 11.9 - 14.6 %    PLATELET 304 118 - 417 K/uL    MPV 9.1 (L) 9.4 - 12.3 FL    ABSOLUTE NRBC 0.00 0.0 - 0.2 K/uL    DF AUTOMATED      NEUTROPHILS 77 43 - 78 %    LYMPHOCYTES 14 13 - 44 %    MONOCYTES 7 4.0 - 12.0 %    EOSINOPHILS 2 0.5 - 7.8 %    BASOPHILS 0 0.0 - 2.0 %    IMMATURE GRANULOCYTES 1 0.0 - 5.0 %    ABS. NEUTROPHILS 8.1 1.7 - 8.2 K/UL    ABS. LYMPHOCYTES 1.5 0.5 - 4.6 K/UL    ABS. MONOCYTES 0.7 0.1 - 1.3 K/UL    ABS. EOSINOPHILS 0.2 0.0 - 0.8 K/UL    ABS. BASOPHILS 0.0 0.0 - 0.2 K/UL    ABS. IMM. GRANS. 0.1 0.0 - 0.5 K/UL   BLOOD TYPE O Positive    Assessment and Plan:      Active Problems:    Dichorionic diamniotic twin pregnancy in second trimester (3/9/2022)      Overview: 3/9/2022: Di-Di twins noted on US FCAx2.             Plan: glucola at 26-28 weeks,  mg.            3/23/2022 at [facility]: Normal appearing Di/Di Twin pregnancy with normal NT       and nasal bone x 2.             2022 at [facility]: Normal early anatomy x 2, limited spine on Baby B d/t       position. Baby A: AC 84%, DVP 4.3 cm,       Baby B: AC 53%, DVP 5.1 cm,       CL 4.07 cm TV,             · Return [facility] in 4 weeks for early anatomy, CL      · Nutritional optimization      · Monitor growth and for PTL/cervical insufficiency      · Deliver Di/Di at 38 weeks; sooner for maternal or fetal complications                  AMA (advanced maternal age) multigravida 35+, second trimester (3/9/2022)      Overview: LIZETH 2022 by LMP c/w 11wk US            Plan: NIPT, MFM referral, Start ASA at 12-36 weeks, early/late glucola,        testing at 28 weeks            3/9/2020: NIPT neg x 3, CF/SMA negative            See Di/Di Twin Overview. Smoking (tobacco) complicating pregnancy, second trimester (3/9/2022)      Overview: 3/9/2022: Pt down to 3 cigs/day (from 1 PPD). Encouraged continued       cessation      3/23/2022 at [facility]: Patient reports smoking at most 3 cigs/day with vaping. Pt vapes constantly. Encouraged continue cessation      2022 at [facility]: Pt reports that she is down to 5 cigs per month, but       continues to vapes daily. Provided literature from [employer] about the       dangers of vaping today. · Patient counselled regarding dangers to her and fetus from tobacco use       including IUGR, Abruption and PPROM and PTD. · She will f/u with you regarding options to help with cessation. Wellbutrin and Nicotine patches are pregnancy-safe options if unable to       stop smoking. Recommend avoidance of e-cigarettes/vaping due to concerns       of impact on placental function.        · Reviewed time needed to change a habit as a large component of tobacco       abuse is behavioral. · Reviewed risks to infant of secondhand smoke and increased risk of SIDS       and childhood asthma. Vaginal bleeding in pregnancy, second trimester (4/23/2022)      Constipation during pregnancy in second trimester (4/23/2022)       No active VB on exam.  TVUS reassuring cervical length as noted 3.66 cm   FWB overall reassuring - bedside scan with dividing membrane seen, grossly normal fluid on both sides. Hemodynamically stable, non-acute exam.  Constipation per history - patient did have episode of straining with BM last night. Reviewed the discharge with old blood may be due to straining, will recommend miralaxx and colace regularly to prevent constipation. Spoke with Dr. Jenny Eid, [facility] who is in agreement, recommends fu as scheduled, sooner next week if persistent sx. Bleeding precautions reviewed in detail. Reviewed with Dr. Shamar Burgess who is in agreement, patient to call office if concerns. Patient expressed understanding.

## 2022-04-23 NOTE — PROGRESS NOTES
[address] April 23, 2022       RE: Normand Cushing      To Whom it May Concern: This is to certify that Normand Cushing has been in the hospital from 4/23/2022 to 4/23/22.       Sincerely,    Delia Kaye RN

## 2022-04-23 NOTE — PROGRESS NOTES
Pt to room PATTI 1 for triage with chief complaint of vaginal bleeding. Assessment begins, EFM and Matteson applied to a soft non tender abdomen and tracing well. Dr Debbi Conroy called to assess patient.

## 2022-04-23 NOTE — PROGRESS NOTES
Pt discharged home with instructions to start taking colace and miralx daily. Pt to return to PATTI for bleeding, leaking of fluid or contractions. Pt verbalizes understanding.

## 2022-04-24 PROCEDURE — 74011250637 HC RX REV CODE- 250/637: Performed by: OBSTETRICS & GYNECOLOGY

## 2022-04-24 PROCEDURE — G0378 HOSPITAL OBSERVATION PER HR: HCPCS

## 2022-04-24 PROCEDURE — 99220 PR INITIAL OBSERVATION CARE/DAY 70 MINUTES: CPT | Performed by: OBSTETRICS & GYNECOLOGY

## 2022-04-24 PROCEDURE — 74011000250 HC RX REV CODE- 250: Performed by: OBSTETRICS & GYNECOLOGY

## 2022-04-24 PROCEDURE — 99213 OFFICE O/P EST LOW 20 MIN: CPT | Performed by: OBSTETRICS & GYNECOLOGY

## 2022-04-24 RX ORDER — CETIRIZINE HCL 10 MG
10 TABLET ORAL
Status: DISCONTINUED | OUTPATIENT
Start: 2022-04-24 | End: 2022-04-26 | Stop reason: HOSPADM

## 2022-04-24 RX ORDER — FAMOTIDINE 20 MG/1
20 TABLET, FILM COATED ORAL
Status: DISCONTINUED | OUTPATIENT
Start: 2022-04-24 | End: 2022-04-26 | Stop reason: HOSPADM

## 2022-04-24 RX ORDER — SERTRALINE HYDROCHLORIDE 100 MG/1
100 TABLET, FILM COATED ORAL
Status: DISCONTINUED | OUTPATIENT
Start: 2022-04-24 | End: 2022-04-26 | Stop reason: HOSPADM

## 2022-04-24 RX ORDER — GUAIFENESIN 100 MG/5ML
162 LIQUID (ML) ORAL
Status: DISCONTINUED | OUTPATIENT
Start: 2022-04-24 | End: 2022-04-25

## 2022-04-24 RX ORDER — MELATONIN
1000
Status: DISCONTINUED | OUTPATIENT
Start: 2022-04-24 | End: 2022-04-26 | Stop reason: HOSPADM

## 2022-04-24 RX ORDER — FOLIC ACID 1 MG/1
1 TABLET ORAL
Status: DISCONTINUED | OUTPATIENT
Start: 2022-04-24 | End: 2022-04-26 | Stop reason: HOSPADM

## 2022-04-24 RX ORDER — POLYETHYLENE GLYCOL 3350 17 G/17G
17 POWDER, FOR SOLUTION ORAL AS NEEDED
Status: DISCONTINUED | OUTPATIENT
Start: 2022-04-24 | End: 2022-04-26 | Stop reason: HOSPADM

## 2022-04-24 RX ORDER — DIPHENHYDRAMINE HCL 25 MG
25 CAPSULE ORAL
Status: DISCONTINUED | OUTPATIENT
Start: 2022-04-24 | End: 2022-04-26 | Stop reason: HOSPADM

## 2022-04-24 RX ORDER — PRENATAL VIT 96/IRON FUM/FOLIC 27MG-0.8MG
1 TABLET ORAL
Status: DISCONTINUED | OUTPATIENT
Start: 2022-04-24 | End: 2022-04-26 | Stop reason: HOSPADM

## 2022-04-24 RX ADMIN — FOLIC ACID 1 MG: 1 TABLET ORAL at 20:55

## 2022-04-24 RX ADMIN — CETIRIZINE HYDROCHLORIDE 10 MG: 10 TABLET, FILM COATED ORAL at 20:55

## 2022-04-24 RX ADMIN — ASPIRIN 162 MG: 81 TABLET, CHEWABLE ORAL at 20:55

## 2022-04-24 RX ADMIN — SERTRALINE 100 MG: 100 TABLET, FILM COATED ORAL at 20:55

## 2022-04-24 RX ADMIN — VITAMIN D, TAB 1000IU (100/BT) 1000 UNITS: 25 TAB at 20:55

## 2022-04-24 RX ADMIN — PRENATAL VIT W/ FE FUMARATE-FA TAB 27-0.8 MG 1 TABLET: 27-0.8 TAB at 20:55

## 2022-04-24 RX ADMIN — SODIUM CHLORIDE, PRESERVATIVE FREE 10 ML: 5 INJECTION INTRAVENOUS at 20:58

## 2022-04-24 RX ADMIN — ACETAMINOPHEN 325MG 650 MG: 325 TABLET ORAL at 07:08

## 2022-04-24 NOTE — PROGRESS NOTES
Pt to PATTI with complaints of vaginal bleeding. Immediately pulled emergency call light. RN to restroom. Pt at toilet holding a moderate sized clot in her hands. Pt very upset stating \"I just don't want to look\". Pt reassured. Clot placed to the side and escorted pt to bed. Unmeasurable amount of bleeding in toilet. MD to bedside to perform Transabdominal US and vaginal US, along with speculum exam. Moderate clots expelled during exam (see MD note). Bleeding stable. VS WNL. Pt reassured. Pt to be admitted to 438 for observation per MD.   SBAR to Leann Peck, GILL. Pt care relinquished.

## 2022-04-24 NOTE — PROGRESS NOTES
AntePartum High Risk Pregnancy Note    [address] [place] [address]: 1    Patient admitted for vaginal bleeding in the setting of di/di twins at 12w11d Estimated Date of Delivery: 22. Overnight she reports her bleeding has improved. She still has spotting on the toilet paper with wiping. She has had anatomy scan with MFM with normal CL and anterior placenta without previa. Coags collected last night. Hg has dropped from 11.5 > 10.8. Vitals:  Patient Vitals for the past 24 hrs:   BP Temp Pulse Resp   22 0624 (!) 129/57 98.8 °F (37.1 °C) 86 18   22 2249 113/65 98.9 °F (37.2 °C) 81 18   22 2223  98.3 °F (36.8 °C)  18   22 222 114/60  95      Temp (24hrs), Av.7 °F (37.1 °C), Min:98.3 °F (36.8 °C), Max:98.9 °F (37.2 °C)     07 -  1900  In: -   Out: 100 [Urine:100]   190 -  0700  In: -   Out: 224     Exam:  Patient without distress. Abdomen soft, non-tender               Fundus soft and non tender                  + FCA x 2 last night        Labs:   Recent Results (from the past 24 hour(s))   CBC WITH AUTOMATED DIFF    Collection Time: 22 10:59 PM   Result Value Ref Range    WBC 11.2 (H) 4.3 - 11.1 K/uL    RBC 3.46 (L) 4.05 - 5.2 M/uL    HGB 10.8 (L) 11.7 - 15.4 g/dL    HCT 32.0 (L) 35.8 - 46.3 %    MCV 92.5 79.6 - 97.8 FL    MCH 31.2 26.1 - 32.9 PG    MCHC 33.8 31.4 - 35.0 g/dL    RDW 12.9 11.9 - 14.6 %    PLATELET 153 353 - 674 K/uL    MPV 8.9 (L) 9.4 - 12.3 FL    ABSOLUTE NRBC 0.00 0.0 - 0.2 K/uL    DF AUTOMATED      NEUTROPHILS 71 43 - 78 %    LYMPHOCYTES 20 13 - 44 %    MONOCYTES 6 4.0 - 12.0 %    EOSINOPHILS 2 0.5 - 7.8 %    BASOPHILS 0 0.0 - 2.0 %    IMMATURE GRANULOCYTES 0 0.0 - 5.0 %    ABS. NEUTROPHILS 8.0 1.7 - 8.2 K/UL    ABS. LYMPHOCYTES 2.3 0.5 - 4.6 K/UL    ABS. MONOCYTES 0.7 0.1 - 1.3 K/UL    ABS. EOSINOPHILS 0.2 0.0 - 0.8 K/UL    ABS. BASOPHILS 0.0 0.0 - 0.2 K/UL    ABS. IMMGuille Flores Beam. 0.1 0.0 - 0.5 K/UL   PROTHROMBIN TIME + INR    Collection Time: 22 10:59 PM   Result Value Ref Range    Prothrombin time 12.5 (L) 12.6 - 14.5 sec    INR 0.9     PTT    Collection Time: 22 10:59 PM   Result Value Ref Range    aPTT 25.8 24.1 - 35.1 SEC   FIBRINOGEN    Collection Time: 22 10:59 PM   Result Value Ref Range    Fibrinogen 388 190 - 501 mg/dL       Assessment and Plan:    Patient Active Problem List    Diagnosis Date Noted    Vaginal bleeding in pregnancy, second trimester 2022    Constipation during pregnancy in second trimester 2022    Vaginal bleeding affecting early pregnancy 2022    Anxiety during pregnancy 2022    Obesity affecting pregnancy in second trimester 2022    Dichorionic diamniotic twin pregnancy in second trimester 2022    AMA (advanced maternal age) multigravida 35+, second trimester 2022    Smoking (tobacco) complicating pregnancy, second trimester 2022     39 yo   - MFM consulted and to see this AM  - Suspect abruption   - labs stable.  Will start PO iron  - SCDs for DVT ppx  - Likely inpatient until bleeding improves x 24-48 hrs    Desiree Garcia DO

## 2022-04-24 NOTE — CONSULTS
Maternal Fetal Medicine Inpatient Consult Note    Chief Complaint:  Pregnancy and vaginal bleeding . History of Present Illness: 40 y.o.  at 12w11d gestation who presents with an episode of heavy vaginal bleeding overnight. Previously had spotting in early pregnancy. Evaluated overnight Friday night with dark blood coinciding with straining for bowel movement. She complains of left pelvic pressure coinciding with episode of bleeding. No other contractions. No LOF. No current bright red bleeding. Ultrasound by [facility] this am reassuring. Prior ultrasounds with MFM also reassuring. Patient denies HA, abdominal pain, vision changes. Good FM. Minimal edema. No chest pain or shortness of breath. No significant reflux, nausea, constipation, or other GI complaints. Review of Systems:  A comprehensive review of systems was negative except for that written in the HPI.      History:  OB History    Para Term  AB Living   2 1 1     1   SAB IAB Ectopic Molar Multiple Live Births             1      # Outcome Date GA Lbr Michael/2nd Weight Sex Delivery Anes PTL Lv   2 Current            1 Term 12 40w2d  7 lb 2 oz (3.232 kg) F Vag-Spont  N WES       Past Surgical History:   Procedure Laterality Date    HX CHOLECYSTECTOMY      IR CHOLECYSTOSTOMY PERCUTANEOUS         Past Medical History:   Diagnosis Date    Hypothyroidism     in  was on synthroid was told it was stress induced     Polycystic disease, ovaries     In past (resolved per patient)       Family History   Problem Relation Age of Onset    Diabetes Mother     Stroke Mother     Diabetes Father     Cancer Maternal Grandmother     Mult Sclerosis Paternal Grandmother     Diabetes Brother     Breast Cancer Neg Hx     Ovarian Cancer Neg Hx     Uterine Cancer Neg Hx     Colon Cancer Neg Hx        Social History     Socioeconomic History    Marital status:      Spouse name: Not on file    Number of children: Not on file    Years of education: Not on file    Highest education level: Not on file   Occupational History    Not on file   Tobacco Use    Smoking status: Current Every Day Smoker     Packs/day: 0.25     Types: Cigarettes    Smokeless tobacco: Never Used    Tobacco comment: patient is vaping daily    Vaping Use    Vaping Use: Every day    Substances: Nicotine   Substance and Sexual Activity    Alcohol use: Not Currently    Drug use: Not Currently    Sexual activity: Yes     Birth control/protection: None   Other Topics Concern     Service Not Asked    Blood Transfusions Not Asked    Caffeine Concern Not Asked    Occupational Exposure Not Asked    Hobby Hazards Not Asked    Sleep Concern Not Asked    Stress Concern Not Asked    Weight Concern Not Asked    Special Diet Not Asked    Back Care Not Asked    Exercise Not Asked    Bike Helmet Not Asked    Seat Belt Not Asked    Self-Exams Not Asked   Social History Narrative    Abuse: Feels safe at home, no history of physical abuse, no history of sexual abuse      Social Determinants of Health     Financial Resource Strain:     Difficulty of Paying Living Expenses: Not on file   Food Insecurity:     Worried About Running Out of Food in the Last Year: Not on file    Seth of Food in the Last Year: Not on file   Transportation Needs:     Lack of Transportation (Medical): Not on file    Lack of Transportation (Non-Medical):  Not on file   Physical Activity:     Days of Exercise per Week: Not on file    Minutes of Exercise per Session: Not on file   Stress:     Feeling of Stress : Not on file   Social Connections:     Frequency of Communication with Friends and Family: Not on file    Frequency of Social Gatherings with Friends and Family: Not on file    Attends Anabaptism Services: Not on file    Active Member of Clubs or Organizations: Not on file    Attends Club or Organization Meetings: Not on file    Marital Status: Not on file Intimate Partner Violence:     Fear of Current or Ex-Partner: Not on file    Emotionally Abused: Not on file    Physically Abused: Not on file    Sexually Abused: Not on file   Housing Stability:     Unable to Pay for Housing in the Last Year: Not on file    Number of Places Lived in the Last Year: Not on file    Unstable Housing in the Last Year: Not on file       Allergies   Allergen Reactions    Latex Rash    Banana Itching    Josue Rash and Itching         Current Facility-Administered Medications:     diphenhydrAMINE (BENADRYL) capsule 25 mg, 25 mg, Oral, Q6H PRN, Desiree Garcia,     famotidine (PEPCID) tablet 20 mg, 20 mg, Oral, BID PRN, Desiree Garcia,     sodium chloride (NS) flush 5-40 mL, 5-40 mL, IntraVENous, Q8H, Alli MULTANI MD    sodium chloride (NS) flush 5-40 mL, 5-40 mL, IntraVENous, PRN, Alli MULTANI MD    acetaminophen (TYLENOL) tablet 650 mg, 650 mg, Oral, Q4H PRN, Alli MULTANI MD, 650 mg at 04/24/22 0708    Objective:     Vitals:     Patient Vitals for the past 24 hrs:   Temp Pulse Resp BP   04/24/22 0624 98.8 °F (37.1 °C) 86 18 (!) 129/57   04/23/22 2249 98.9 °F (37.2 °C) 81 18 113/65   04/23/22 2223 98.3 °F (36.8 °C)  18    04/23/22 2222  95  114/60        I&O:   04/24 0701 - 04/24 1900  In: -   Out: 100 [Urine:100]             04/22 1901 - 04/24 0700  In: -   Out: 224   Output by Drain (mL) 04/22/22 0701 - 04/22/22 1900 04/22/22 1901 - 04/23/22 0700 04/23/22 0701 - 04/23/22 1900 04/23/22 1901 - 04/24/22 0700 04/24/22 0701 - 04/24/22 1158   Patient has no LDAs of requested type attached. Exam:   Constitutional: Patient without distress. HEENT: Symmetric without facial palsy, uncorrected poor hearing or uncorrected poor vision.  No thyroid enlargement or goiter  Chest: No use of accessory muscles or excessive work of breathing  Abdomen: gravid, soft,  tender; Fundus: soft and non tender  Genitourinary: deferred as without complaints of labor or ROM  Lower Extremities: Edema: No bilaterally  Skin: normal coloration and turgor, no rashes, no suspicious skin lesions noted. Neurologic: AOx3. Gait normal. Reflexes and motor strength normal and symmetric. Cranial nerves 2-12 and sensation grossly intact. Psychiatric: Mood appropriate, non focal; tearful when discussing loss of mother. Labs:   CBC:    Recent Labs     22  2259 22  0723 22  1015 22  1335   WBC 11.2* 10.6 7.6 7.9   HGB 10.8* 11.5* 12.8 12.8   HCT 32.0* 34.2* 39.1 38.7    295 322 287     CMP:   Recent Labs     22  0723 22  1335    138   K 3.8 3.5   * 105   CO2 24 25   AGAP 8 8   GLU 92 88   BUN 8 7   CREA 0.53* 0.66   GFRAA >60 >60   GFRNA >60 >60   CA 8.7 8.9   ALB  --  3.3*   TP  --  7.3   GLOB  --  4.0*   AGRAT  --  0.8*   ALT  --  19       No results for input(s): URICA, LDH, PUQ in the last 7224 hours. Recent Glucose Results: Recent Glucose Results:   Recent Labs     22  0723 22  1335   GLU 92 88       Imaging: images by [facility] reviewed - difficult to interpret due to quality. Discussed with Dr. Fide Conner, normal CL, no obvious abruption, no intracanal cervical polyp seen. No polyp noted on SSE. Assessment and Plan:  40 y.o.  at 17w6d with     Principal Problem:    Vaginal bleeding in pregnancy, second trimester (2022)    Active Problems:    Dichorionic diamniotic twin pregnancy in second trimester (3/9/2022)      Overview: 3/9/2022: Di-Di twins noted on US FCAx2. Plan: glucola at 26-28 weeks,  mg.            3/23/2022 at [facility]: Normal appearing Di/Di Twin pregnancy with normal NT       and nasal bone x 2.             2022 at [facility]: Normal early anatomy x 2, limited spine on Baby B d/t       position.        Baby A: AC 84%, DVP 4.3 cm,       Baby B: AC 53%, DVP 5.1 cm,       CL 4.07 cm TV,             · Return [facility] in 4 weeks for early anatomy, CL      · Nutritional optimization · Monitor growth and for PTL/cervical insufficiency      · Deliver Di/Di at 38 weeks; sooner for maternal or fetal complications                  AMA (advanced maternal age) multigravida 35+, second trimester (3/9/2022)      Overview: LIZETH 2022 by LMP c/w 11wk US            Plan: NIPT, MFM referral, Start ASA at 12-36 weeks, early/late glucola,        testing at 28 weeks            3/9/2020: NIPT neg x 3, CF/SMA negative            See Di/Di Twin Overview. Antepartum bleeding in second/third trimesters, unrelated to labor, may affect 4-5% of pregnancies. The major causes are:  · Placenta previa (20 percent)  · Abruptio placenta (30 percent)  · Uterine rupture (rare)  · Vasa previa (rare)    Other causes with less significant pathology include bleeding from cervical ectropion, cervical polyp, friable vaginal/cervical tissue due to vaginitis, due to intercourse or straining with valsalva. \"Vaginal bleeding\" may actually have urinary or GI source also. These do not appear likely in this situation. As source not obvious, but concerning for cervico-uterine origin, recommend close observation. With resolution after single limited episode, feel that 24 hour observation is sufficient to rule out labor as a result of concealed abruption too small to be seen on ultrasound. However, as patient has returned twice in 24hr with bleeding, will plan to remain inpt for at least 48hr. In general, recommend inpatient care for 48-72 hours without bleeding with first significant bleed. With a second bleed, would monitor for full week without bleeding, OR if late gestation, remain inpatient until delivery. If managed as an outpatient with a third bleed would recommend monitoring inpatient until delivery. Will repeat imaging on 22 by [facility] sonographers. Pt may ambulate and eat. Works at [employer] in position with ability to sit majority of time.  OK to continue to work at this time.        Time:80  Minutes spent on floor,with greater than 50% of the time examining patient, explaining plan and coordinating care with nurse and requesting primary physician.

## 2022-04-24 NOTE — PROGRESS NOTES
In room to round on patient, patient sleeping. Patient left undisturbed with call bell within reach.

## 2022-04-24 NOTE — PROGRESS NOTES
Patient admitted to 438 for observation of vaginal bleeding. IV started labs drawn, plan of care reviewed with patient.

## 2022-04-24 NOTE — H&P
Chief Complaint   Patient presents with    Vaginal Bleeding       40 y.o. female  at 17w5d  weeks gestation who requests evaluation for vaginal bleeding. Was seen early this am for dark red blood per vagina. Had normal cervix length and ob us. Labs ok. Started having bright red bleeding with clots around 830. Feels some pressure but not really cramping. No intercourse. Had gender reveal party today but no strenuous activity.     Her pregnancy issues include: ama, di di twins, obesity, anxiety, consitpation    Fetal movement has beennormal .    HISTORY:  OB History    Para Term  AB Living   2 1 1     1   SAB IAB Ectopic Molar Multiple Live Births             1      # Outcome Date GA Lbr Michael/2nd Weight Sex Delivery Anes PTL Lv   2 Current            1 Term 12 40w2d  3.232 kg F Vag-Spont  N WES       Past Surgical History:   Procedure Laterality Date    HX CHOLECYSTECTOMY      IR CHOLECYSTOSTOMY PERCUTANEOUS         Past Medical History:   Diagnosis Date    Hypothyroidism     in  was on synthroid was told it was stress induced     Polycystic disease, ovaries     In past (resolved per patient)       Allergies   Allergen Reactions    Latex Rash    Banana Itching    [place] Rash and Itching       Family History   Problem Relation Age of Onset    Diabetes Mother     Stroke Mother     Diabetes Father     Cancer Maternal Grandmother     Mult Sclerosis Paternal Grandmother     Diabetes Brother     Breast Cancer Neg Hx     Ovarian Cancer Neg Hx     Uterine Cancer Neg Hx     Colon Cancer Neg Hx        Social History     Socioeconomic History    Marital status:      Spouse name: Not on file    Number of children: Not on file    Years of education: Not on file    Highest education level: Not on file   Occupational History    Not on file   Tobacco Use    Smoking status: Current Every Day Smoker     Packs/day: 0.25     Types: Cigarettes    Smokeless tobacco: Never Used   Duana Big Tobacco comment: patient is vaping daily    Vaping Use    Vaping Use: Every day    Substances: Nicotine   Substance and Sexual Activity    Alcohol use: Not Currently    Drug use: Not Currently    Sexual activity: Yes     Birth control/protection: None   Other Topics Concern     Service Not Asked    Blood Transfusions Not Asked    Caffeine Concern Not Asked    Occupational Exposure Not Asked    Hobby Hazards Not Asked    Sleep Concern Not Asked    Stress Concern Not Asked    Weight Concern Not Asked    Special Diet Not Asked    Back Care Not Asked    Exercise Not Asked    Bike Helmet Not Asked    Seat Belt Not Asked    Self-Exams Not Asked   Social History Narrative    Abuse: Feels safe at home, no history of physical abuse, no history of sexual abuse      Social Determinants of Health     Financial Resource Strain:     Difficulty of Paying Living Expenses: Not on file   Food Insecurity:     Worried About Running Out of Food in the Last Year: Not on file    Seth of Food in the Last Year: Not on file   Transportation Needs:     Lack of Transportation (Medical): Not on file    Lack of Transportation (Non-Medical):  Not on file   Physical Activity:     Days of Exercise per Week: Not on file    Minutes of Exercise per Session: Not on file   Stress:     Feeling of Stress : Not on file   Social Connections:     Frequency of Communication with Friends and Family: Not on file    Frequency of Social Gatherings with Friends and Family: Not on file    Attends Tenriism Services: Not on file    Active Member of Clubs or Organizations: Not on file    Attends Club or Organization Meetings: Not on file    Marital Status: Not on file   Intimate Partner Violence:     Fear of Current or Ex-Partner: Not on file    Emotionally Abused: Not on file    Physically Abused: Not on file    Sexually Abused: Not on file   Housing Stability:     Unable to Pay for Housing in the Last Year: Not on file    Number of Places Lived in the Last Year: Not on file    Unstable Housing in the Last Year: Not on file       ROS:  Negative:   negative 10 point ROS except as noted in HPI    Positive:   per hpi    PHYSICAL EXAM:  Temperature 98.3 °F (36.8 °C), resp. rate 18, last menstrual period 12/20/2021. The patient appears well, alert, oriented x 3. Appropriate affect. Abdomen soft, non-tender, no rebound/guarding, normoactive bs. Fundus soft and non tender  Skin warm, dry, no rashes  Ext tr edema, DTR's normal  SSE: formed large clots coming from os and filling vault, removed. No additional active bleeding seen. Recent Results (from the past 24 hour(s))   CBC WITH AUTOMATED DIFF    Collection Time: 04/23/22  7:23 AM   Result Value Ref Range    WBC 10.6 4.3 - 11.1 K/uL    RBC 3.68 (L) 4.05 - 5.2 M/uL    HGB 11.5 (L) 11.7 - 15.4 g/dL    HCT 34.2 (L) 35.8 - 46.3 %    MCV 92.9 79.6 - 97.8 FL    MCH 31.3 26.1 - 32.9 PG    MCHC 33.6 31.4 - 35.0 g/dL    RDW 12.9 11.9 - 14.6 %    PLATELET 170 795 - 885 K/uL    MPV 9.1 (L) 9.4 - 12.3 FL    ABSOLUTE NRBC 0.00 0.0 - 0.2 K/uL    DF AUTOMATED      NEUTROPHILS 77 43 - 78 %    LYMPHOCYTES 14 13 - 44 %    MONOCYTES 7 4.0 - 12.0 %    EOSINOPHILS 2 0.5 - 7.8 %    BASOPHILS 0 0.0 - 2.0 %    IMMATURE GRANULOCYTES 1 0.0 - 5.0 %    ABS. NEUTROPHILS 8.1 1.7 - 8.2 K/UL    ABS. LYMPHOCYTES 1.5 0.5 - 4.6 K/UL    ABS. MONOCYTES 0.7 0.1 - 1.3 K/UL    ABS. EOSINOPHILS 0.2 0.0 - 0.8 K/UL    ABS. BASOPHILS 0.0 0.0 - 0.2 K/UL    ABS. IMM.  GRANS. 0.1 0.0 - 0.5 K/UL   METABOLIC PANEL, BASIC    Collection Time: 04/23/22  7:23 AM   Result Value Ref Range    Sodium 141 136 - 145 mmol/L    Potassium 3.8 3.5 - 5.1 mmol/L    Chloride 109 (H) 98 - 107 mmol/L    CO2 24 21 - 32 mmol/L    Anion gap 8 7 - 16 mmol/L    Glucose 92 65 - 100 mg/dL    BUN 8 6 - 23 MG/DL    Creatinine 0.53 (L) 0.6 - 1.0 MG/DL    GFR est AA >60 >60 ml/min/1.73m2    GFR est non-AA >60 >60 ml/min/1.73m2    Calcium 8.7 8.3 - 10.4 MG/DL   BETA HCG, QT    Collection Time: 04/23/22  7:23 AM   Result Value Ref Range    Beta HCG, QT 30,086 (H) 0.0 - 6.0 MIU/ML   BLOOD TYPE, (ABO+RH)    Collection Time: 04/23/22  7:23 AM   Result Value Ref Range    ABO/Rh(D) O POSITIVE        TVUS: cervical length 4.0-4.3 on 3 measurements. i performed and read the us. pictures scanned into record. no previa noted. OBUS  Fetal Heart Rate: us x2  Twin a vtx, plac ant, twin b tr mat left, plac fundal post. Membrane seen. Subjectively normal fluid seen around both fetuses. I personally reviewed and interpreted the FHR tracing  I have personally reviewed the patient's history, prenatal record, and pertinent test results. vital sign trends, laboratory results, previous provider notes support my clinical impression. Assessment:  40 y.o. female at [facility] twins, vaginal bleeding with no obvious cause. Believe membranes are intact  Reassuring fetal status    Plan:  Findings and test results were discussed. Will admit for observation. mfm consult in am. coags and repeat labs now. Time spent with patient consistent with level of care.  rosina Levy who agrees with plan of care    Signed By:  Jw Tsang MD     April 23, 2022

## 2022-04-25 LAB
C TRACH RRNA SPEC QL NAA+PROBE: NORMAL
N GONORRHOEA RRNA SPEC QL NAA+PROBE: NORMAL
SPECIMEN SOURCE: NORMAL

## 2022-04-25 PROCEDURE — 76817 TRANSVAGINAL US OBSTETRIC: CPT | Performed by: OBSTETRICS & GYNECOLOGY

## 2022-04-25 PROCEDURE — G0378 HOSPITAL OBSERVATION PER HR: HCPCS

## 2022-04-25 PROCEDURE — 76815 OB US LIMITED FETUS(S): CPT | Performed by: OBSTETRICS & GYNECOLOGY

## 2022-04-25 PROCEDURE — 74011250637 HC RX REV CODE- 250/637: Performed by: OBSTETRICS & GYNECOLOGY

## 2022-04-25 PROCEDURE — 99213 OFFICE O/P EST LOW 20 MIN: CPT | Performed by: OBSTETRICS & GYNECOLOGY

## 2022-04-25 RX ADMIN — PRENATAL VIT W/ FE FUMARATE-FA TAB 27-0.8 MG 1 TABLET: 27-0.8 TAB at 20:26

## 2022-04-25 RX ADMIN — SERTRALINE 100 MG: 100 TABLET, FILM COATED ORAL at 20:26

## 2022-04-25 RX ADMIN — CETIRIZINE HYDROCHLORIDE 10 MG: 10 TABLET, FILM COATED ORAL at 20:26

## 2022-04-25 RX ADMIN — FOLIC ACID 1 MG: 1 TABLET ORAL at 20:26

## 2022-04-25 RX ADMIN — VITAMIN D, TAB 1000IU (100/BT) 1000 UNITS: 25 TAB at 20:26

## 2022-04-25 NOTE — PROCEDURES
[facility] US Report  Bedside US done by [facility] CT Sonographer. Findings:  Normal appearing viable Twin Pregnancy. Normal appearing placentas, no sign of blood or abruption. Normal ELIDA in both sacs. Both fetuses active. Normal Cervical Length measuring 3.8 cms   See recent US in EHR for biometry.

## 2022-04-25 NOTE — PROGRESS NOTES
AntePartum High Risk Pregnancy Note    [facility] Day: 2    Patient is a 44yo  with Milagros Twins admitted for VB of unclear etiology, now at 8w0d Estimated Date of Delivery: 22. Rh pos. Anterior placenta without previa. US images reassuring yesterday with normal CL, no obvious abruption, no intracanal cervical polyp seen. No polyp noted on SSE by hospitalist on admission. Pt reports vb has significantly decreased. Only \"red mucousy discharge\" last night with some very small clots. No pain at all. Pt is still taking ASA 162mg for PreE ppx.    hgb 11.5 --> 10.8       Vitals:    Patient Vitals for the past 24 hrs:   BP Temp Pulse Resp SpO2   22 0922 (!) 105/52 98.4 °F (36.9 °C) 81 18 98 %   22 1926 (!) 121/56 98.7 °F (37.1 °C) 78 18 98 %   22 1311 110/60 98.9 °F (37.2 °C) 85 17 98 %     Temp (24hrs), Av.7 °F (37.1 °C), Min:98.4 °F (36.9 °C), Max:98.9 °F (37.2 °C)    No intake/output data recorded.  1901 -  0700  In: -   Out: 65 [Urine:100]    Physical Exam:  Constitutional: She appears well-developed and well-nourished. No distress. HENT:    Head: Normocephalic and atraumatic. Lungs: CTAB, effort normal, no rales/crackles  Cardiovascular: RRR, no M/R/G  Abd:  Gravid, no RUQ TTP  Skin: She is not diaphoretic. Psychiatric: She has a normal mood and affect. Her behavior is normal. Thought content normal.            Labs:   No results found for this or any previous visit (from the past 24 hour(s)).         Assessment and Plan:    44yo  at 18w0d with Milagros Twins and VB:    -MFM following  -repeat MFM US today   -DC ASA for now per MFM   -DC timing per MFM       Patient Active Problem List    Diagnosis Date Noted    Vaginal bleeding in pregnancy, second trimester 2022    Constipation during pregnancy in second trimester 2022    Anxiety during pregnancy 2022    Obesity affecting pregnancy in second trimester 03/21/2022    Dichorionic diamniotic twin pregnancy in second trimester 03/09/2022    AMA (advanced maternal age) multigravida 35+, second trimester 03/09/2022    Smoking (tobacco) complicating pregnancy, second trimester 03/09/2022     Teresa Ocampo MD

## 2022-04-25 NOTE — PROGRESS NOTES
Assessment as documented. Pt reports old blood when wiping. Denies needs. Instructed to call out with changes or needs.

## 2022-04-25 NOTE — PROGRESS NOTES
TOCO applied to soft, non-tender abdomen. Pt denies cramping or LOF. Reports +FM and minimal bleeding throughout day with a couple small clots measuring less than 1 cm in size. Able to intermittently trace twin A and B with use of US and doppler. Pt complains of some slight \"pressure\" in her lower left abdomen. VS WNL. Encouraged pt to call out if she has any concerns or an increase in bleeding.

## 2022-04-25 NOTE — PROGRESS NOTES
Chart reviewed - Patient admitted over the weekend at 17w5d with vaginal bleeding; twin gestation. SW met with patient while social distancing w/appropriate PPE. Patient was provided the opportunity to share how she's doing at this time. Sadly, patient's  passed away in May 2021, and her mother passed away in January 2022. Patient confirms ongoing emotional difficulty due to these losses. Patient has a 4 y/o daughter and 10 y/o \"bonus baby. \"  She reports having lots of local family available for support/assistance. Patient denied the need for counseling resources as her family and friends have been a significant source of support and help. Patient was provided this 's contact information for any needs/questions. SW will continue to follow.     NETTA Araiza, [address] [facility]   [phone]

## 2022-04-26 VITALS
DIASTOLIC BLOOD PRESSURE: 58 MMHG | SYSTOLIC BLOOD PRESSURE: 128 MMHG | RESPIRATION RATE: 16 BRPM | OXYGEN SATURATION: 97 % | TEMPERATURE: 98.3 F | HEART RATE: 89 BPM

## 2022-04-26 PROCEDURE — 99217 PR OBSERVATION CARE DISCHARGE MANAGEMENT: CPT | Performed by: OBSTETRICS & GYNECOLOGY

## 2022-04-26 PROCEDURE — G0378 HOSPITAL OBSERVATION PER HR: HCPCS

## 2022-04-26 NOTE — PROGRESS NOTES
High Risk Obstetrics Progress Note    Name: Makeda Shah MRN: [id number]  SSN: [id number]    YOB: 1985  Age: 40 y.o. Sex: female      Subjective:      LOS: 0 days    Estimated Date of Delivery: 22   Gestational Age Today: 21w2d     Patient admitted for vaginal bleeding. States she has markedly decreased bleeding, now only spotting/ with wiping and dark. No pain. +FM x2. No other concerns. Objective:     Vitals:  Blood pressure (!) 128/58, pulse 89, temperature 98.3 °F (36.8 °C), resp. rate 16, last menstrual period 2021, SpO2 97 %. Temp (24hrs), Av.2 °F (36.8 °C), Min:98.1 °F (36.7 °C), Max:98.3 °F (63.5 °C)    Systolic (82XGA), INC:450 , Min:115 , SQF:324      Diastolic (94ARH), UQR:07, Min:56, Max:58       Intake and Output:         Physical Exam:  Patient without distress. A&Ox3  Abdomen: soft, nontender, distended, without guarding, without rebound  Fundus: soft and non tender         Uterine Activity:  None    Fetal Heart Rate:  Present. US reassuring x 2 yesterday        Labs: No results found for this or any previous visit (from the past 36 hour(s)). Assessment and Plan:      Principal Problem:    Vaginal bleeding in pregnancy, second trimester (2022)    Active Problems:    Dichorionic diamniotic twin pregnancy in second trimester (3/9/2022)      Overview: 3/9/2022: Di-Di twins noted on US FCAx2. Plan: glucola at 26-28 weeks,  mg.            3/23/2022 at [facility]: Normal appearing Di/Di Twin pregnancy with normal NT       and nasal bone x 2.             2022 at [facility]: Normal early anatomy x 2, limited spine on Baby B d/t       position.        Baby A: AC 84%, DVP 4.3 cm,       Baby B: AC 53%, DVP 5.1 cm,       CL 4.07 cm TV,             · Return [facility] in 4 weeks for early anatomy, CL      · Nutritional optimization      · Monitor growth and for PTL/cervical insufficiency      · Deliver Di/Di at 38 weeks; sooner for maternal or fetal complications                  AMA (advanced maternal age) multigravida 33+, second trimester (3/9/2022)      Overview: LIZETH 2022 by [facility] c/w 11wk US            Plan: NIPT, MFM referral, Start ASA at 12-36 weeks, early/late glucola,        testing at 28 weeks            3/9/2020: NIPT neg x 3, CF/SMA negative            See Di/Di Twin Overview. Course has stabilized. Will discharge home. Hold ASA. Continue pelvic rest for the forseeable future, at least two weeks. Limited activity at least one week minimum. Has appt with BSO on , rec keep that. Precautions reviewed. RTH with bright red bleeding. Plan confirmed with MFM.

## 2022-04-26 NOTE — PROGRESS NOTES
[address] 34 April 26, 2022       RE: Elisha Kennedy      To Whom it May Concern: This is to certify that Elisha Kennedy has been in the hospital from 4/23/2022 to 4/26/2022.       Sincerely,    Kevin Burton RN

## 2022-04-26 NOTE — CONSULTS
[facility] Progress Note  Chart reviewed. Discussed with Dr. Giovanni Reed. No further bleeding. Can go home with precautions.

## 2022-04-26 NOTE — DISCHARGE INSTRUCTIONS
Patient Education   Patient Education        Counting Your Baby's Kicks: Care Instructions  Overview     Counting your baby's kicks is one way your doctor can tell that your baby is healthy. Most women--especially in a first pregnancy--feel their baby move for the first time between 16 and 22 weeks. The movement may feel like flutters rather than kicks. Your baby may move more at certain times of the day. When you are active, you may notice less kicking than when you are resting. At your prenatal visits, your doctor will ask whether the baby is active. In your last trimester, your doctor may ask you to count the number of times you feel your baby move. Follow-up care is a key part of your treatment and safety. Be sure to make and go to all appointments, and call your doctor if you are having problems. It's also a good idea to know your test results and keep a list of the medicines you take. How do you count fetal kicks? · A common method of checking your baby's movement is to note the length of time it takes to count ten movements (such as kicks, flutters, or rolls). · Pick your baby's most active time of day to count. This may be any time from morning to evening. · If you don't feel 10 movements in an hour, have something to eat or drink and count for another hour. If you don't feel at least 10 movements in the 2-hour period, call your doctor. When should you call for help? Call your doctor now or seek immediate medical care if:    · You noticed that your baby has stopped moving or is moving much less than normal.   Watch closely for changes in your health, and be sure to contact your doctor if you have any problems. Where can you learn more? Go to http://www.gray.com/  Enter [id number] in the search box to learn more about \"Counting Your Baby's Kicks: Care Instructions. \"  Current as of: June 16, 2021               Content Version: 13.2  © 0423-0726 Healthwise, [facility].    Care instructions adapted under license by [employer] (which disclaims liability or warranty for this information). If you have questions about a medical condition or this instruction, always ask your healthcare professional. [address] any warranty or liability for your use of this information. Learning About Twin Pregnancy  What is different about a twin pregnancy? In many ways, a twin pregnancy is like a single-baby pregnancy. Healthy twins develop like a single baby does until the last trimester, when their growth slows down. Twins are usually born before the usual 40-week due date. For the mother, carrying twins can be more difficult than carrying a single baby. And her risks are higher for pregnancy problems. That's why keeping up with prenatal checks and tests is especially important. How do twins grow? Twins develop from embryos to babies like a single baby does. · By weeks 10 to 14 of your pregnancy, one or two placentas have formed inside your uterus. It is possible to hear your babies' heartbeats with a special ultrasound device. Your babies' eyes can move. Their arms and legs can bend. · Around weeks 14 to 18, hair is beginning to grow on your babies' heads. · By 18 to 22 weeks, your babies can now suck their thumbs. Around this time, you may start to feel your babies move. At first, this might feel like fluttering or \"butterflies. \"  · Around week 26, your babies can open and close their eyelids. You may notice that they respond to the sound of your or your partner's voice. You may also notice that they do less turning and twisting and more squirming. · Up to 32 weeks, twins grow rapidly. By this point, they really start to look like babies, with hair and plump skin. · Around 32 to 34 weeks, twins' pace of growth slows down. Their lungs become more ready for breathing.  This marks a stage when babies born early are less likely to have breathing problems after birth. What can you expect during a twin pregnancy? With a twin pregnancy, your body makes high levels of pregnancy hormones. So morning sickness may come on earlier and stronger than if you were carrying a single baby. You may also have earlier and more intense symptoms from pregnancy, like swelling, heartburn, leg cramps, bladder discomfort, and sleep problems. Your belly may grow bigger, and you may gain more weight, sooner. Talk to your doctor about how much you might expect to gain. When you're carrying twins, you and your babies may be tested and checked more than you would for a single-baby pregnancy. · At about 10 weeks, an ultrasound can show if the babies are growing in the same amniotic sac. If they each have their own sac and their own placenta, twins have the best chances of both growing well. · Between weeks 18 and 20, an ultrasound may be able to show the sex of your babies. · Later in your pregnancy, you will start to have checkups more often. This will be sooner than for a single-baby pregnancy. Twins tend to be born early, but 37 weeks is a goal when mother and babies are doing well. As you get closer to delivery time, your medical team will help you know what to expect and what to do. As questions come to mind, keep a list so you can remember to ask your doctor. Follow-up care is a key part of your treatment and safety. Be sure to make and go to all appointments, and call your doctor if you are having problems. It's also a good idea to know your test results and keep a list of the medicines you take. Where can you learn more? Go to http://www.gray.com/  Enter B173 in the search box to learn more about \"Learning About Twin Pregnancy. \"  Current as of: June 16, 2021               Content Version: 13.2  © 9282-9647 Healthwise, [employer].    Care instructions adapted under license by [employer] (which disclaims liability or warranty for this information). If you have questions about a medical condition or this instruction, always ask your healthcare professional. [place] [address] any warranty or liability for your use of this information.

## 2022-04-26 NOTE — PROGRESS NOTES
Reviewed d/c instructions with patient, kick counts, labor precautions, and verbalizes understanding. Plans to keep scheduled appts and agrees to come back to hospital if bleeding increases or is bright red in color. PIV removed and pt d/c'd home.

## 2022-04-28 ENCOUNTER — HOSPITAL ENCOUNTER (EMERGENCY)
Age: 37
Discharge: HOME | End: 2022-04-28
Attending: OBSTETRICS & GYNECOLOGY | Admitting: OBSTETRICS & GYNECOLOGY
Payer: COMMERCIAL

## 2022-04-28 VITALS
RESPIRATION RATE: 20 BRPM | HEART RATE: 83 BPM | DIASTOLIC BLOOD PRESSURE: 70 MMHG | SYSTOLIC BLOOD PRESSURE: 118 MMHG | TEMPERATURE: 98 F

## 2022-04-28 PROBLEM — O26.852 SPOTTING AFFECTING PREGNANCY IN SECOND TRIMESTER: Status: ACTIVE | Noted: 2022-04-28

## 2022-04-28 PROCEDURE — 99283 EMERGENCY DEPT VISIT LOW MDM: CPT

## 2022-04-29 NOTE — PROGRESS NOTES
Patient presents to triage from home with complaints of small amount of pink bleeding when she wiped she said that she has had some constipation and was ordered Miralax and colace today and that she did have to strain to have a BM today not sure if this has anything to do with bleeding. Patient was admitted last week for bright red bleeding and clots, since her discharge only has had some dark brown bleeding small amount. Patient states that she has felt good fetal movement.

## 2022-04-29 NOTE — DISCHARGE INSTRUCTIONS
Patient Education   Patient Education   Patient Education        Weeks 14 to 25 of Your Pregnancy: Care Instructions  Overview     During this time, you may start to \"show,\" so that you look pregnant to people around you. You may also notice some changes in your skin, such as itchy spots on your palms or acne on your face. Your baby is now able to pass urine. And your baby's first stool (meconium) is starting to collect in your baby's intestines. Hair is also starting to grow on your baby's head. At your next visit, between weeks 18 and 20, your doctor may do an ultrasound test. The test allows your doctor to check for certain problems. Your doctor can also tell the sex of your baby. So this a good time to think about whether you want to know. Talk to your doctor about getting a flu shot to help keep you healthy during your pregnancy. As your pregnancy moves along, it's common to worry or feel anxious. Your body is changing a lot. And you are thinking about giving birth, the health of your baby, and becoming a parent. You can talk to your doctor about any anxiety and stress you feel. Follow-up care is a key part of your treatment and safety. Be sure to make and go to all appointments, and call your doctor if you are having problems. It's also a good idea to know your test results and keep a list of the medicines you take. How can you care for yourself at home? Reduce stress    · Ask for help with cooking and housekeeping.     · Figure out who or what causes your stress. Avoid these people or situations as much as possible.     · Relax every day. Taking 10- to 15-minute breaks can make a big difference. Take a walk, listen to music, or take a warm bath.     · Learn relaxation techniques at prenatal or yoga class. Or buy a relaxation tape.     · List your fears about having a baby and becoming a parent. Share the list with someone you trust. Decide which worries are really small, and try to let them go. Exercise    · If you did not exercise much before pregnancy, start slowly. Walking is best. [employer], and do a little more every day.     · Brisk walking, easy jogging, low-impact aerobics, water aerobics, and yoga are good choices. Some sports, such as scuba diving, horseback riding, downhill skiing, gymnastics, and water skiing, are not a good idea.     · Try to do at least 2½ hours a week of moderate exercise, such as a fast walk. One way to do this is to be active 30 minutes a day, at least 5 days a week.     · Wear loose clothing. And wear shoes and a bra that provide good support.     · Warm up and cool down to start and finish your exercise.     · If you want to use weights, be sure to use light weights. They reduce stress on your joints. Stay at the best weight for you    · Experts recommend that you gain about 1 pound a month during the first 3 months of your pregnancy.     · Experts recommend that you gain about 1 pound a week during your last 6 months of pregnancy, for a total weight gain of 25 to 35 pounds.     · If you are underweight, you will need to gain more weight (about 28 to 40 pounds).     · If you are overweight, you may not need to gain as much weight (about 15 to 25 pounds).     · If you are gaining weight too fast, use common sense. Exercise every day, and limit sweets, fast foods, and fats. Choose lean meats, fruits, and vegetables.     · If you are having twins or more, your doctor may refer you to a dietitian. Where can you learn more? Go to http://www.gray.com/  Enter I453 in the search box to learn more about \"Weeks 14 to 18 of Your Pregnancy: Care Instructions. \"  Current as of: June 16, 2021               Content Version: 13.2  © 1636-0507 Healthwise, [employer]. Care instructions adapted under license by [employer] (which disclaims liability or warranty for this information).  If you have questions about a medical condition or this instruction, always ask your healthcare professional. [address] any warranty or liability for your use of this information. Vaginal Bleeding During Pregnancy: Care Instructions  Overview     It's common to have some vaginal bleeding when you are pregnant. In some cases, the bleeding isn't serious. And there aren't any more problems with the pregnancy. But sometimes bleeding is a sign of a more serious problem. This is more common if the bleeding is heavy or painful. Examples of more serious problems include miscarriage, an ectopic pregnancy, and a problem with the placenta. You may have to see your doctor again to be sure everything is okay. You may also need more tests to find the cause of the bleeding. Home treatment may be all you need. But it depends on what is causing the bleeding. Be sure to tell your doctor if you have any new symptoms or if your symptoms get worse. The doctor has checked you carefully, but problems can develop later. If you notice any problems or new symptoms, get medical treatment right away. Follow-up care is a key part of your treatment and safety. Be sure to make and go to all appointments, and call your doctor if you are having problems. It's also a good idea to know your test results and keep a list of the medicines you take. How can you care for yourself at home? · If your doctor prescribed medicines, take them exactly as directed. Call your doctor if you think you are having a problem with your medicine. · Do not have sex until your doctor says it's okay. · Do not put anything in your vagina until your doctor says it's okay. · Ask your doctor about other activities you can or can't do. · Get a lot of rest. Being pregnant can make you tired. · Eat a healthy diet. Include a lot of peas, beans, and leafy green vegetables. Talk to a dietitian if you need help planning your diet.   · Do not use nonsteroidal anti-inflammatory drugs (NSAIDs), such as ibuprofen (Advil, Motrin), naproxen (Aleve), or aspirin, unless your doctor says it is okay. When should you call for help? Call 911 anytime you think you may need emergency care. For example, call if:    · You passed out (lost consciousness).     · You have severe vaginal bleeding. Call your doctor now or seek immediate medical care if:    · You have any vaginal bleeding.     · You have pain in your belly or pelvis.     · You think that you are in labor.     · You have a sudden release of fluid from your vagina.     · You notice that your baby has stopped moving or is moving much less than normal.   Watch closely for changes in your health, and be sure to contact your doctor if you have any questions or concerns. Where can you learn more? Go to http://cooper-brigitte.info/  Enter A061 in the search box to learn more about \"Vaginal Bleeding During Pregnancy: Care Instructions. \"  Current as of: June 16, 2021               Content Version: 13.2  © 2006-2022 [employer]. Care instructions adapted under license by [employer] (which disclaims liability or warranty for this information). If you have questions about a medical condition or this instruction, always ask your healthcare professional. [place] [address] any warranty or liability for your use of this information. Learning About Twin Pregnancy  What is different about a twin pregnancy? In many ways, a twin pregnancy is like a single-baby pregnancy. Healthy twins develop like a single baby does until the last trimester, when their growth slows down. Twins are usually born before the usual 40-week due date. For the mother, carrying twins can be more difficult than carrying a single baby. And her risks are higher for pregnancy problems. That's why keeping up with prenatal checks and tests is especially important. How do twins grow?   Twins develop from embryos to babies like a single baby does. · By weeks 10 to 14 of your pregnancy, one or two placentas have formed inside your uterus. It is possible to hear your babies' heartbeats with a special ultrasound device. Your babies' eyes can move. Their arms and legs can bend. · Around weeks 14 to 18, hair is beginning to grow on your babies' heads. · By 18 to 22 weeks, your babies can now suck their thumbs. Around this time, you may start to feel your babies move. At first, this might feel like fluttering or \"butterflies. \"  · Around week 26, your babies can open and close their eyelids. You may notice that they respond to the sound of your or your partner's voice. You may also notice that they do less turning and twisting and more squirming. · Up to 32 weeks, twins grow rapidly. By this point, they really start to look like babies, with hair and plump skin. · Around 32 to 34 weeks, twins' pace of growth slows down. Their lungs become more ready for breathing. This marks a stage when babies born early are less likely to have breathing problems after birth. What can you expect during a twin pregnancy? With a twin pregnancy, your body makes high levels of pregnancy hormones. So morning sickness may come on earlier and stronger than if you were carrying a single baby. You may also have earlier and more intense symptoms from pregnancy, like swelling, heartburn, leg cramps, bladder discomfort, and sleep problems. Your belly may grow bigger, and you may gain more weight, sooner. Talk to your doctor about how much you might expect to gain. When you're carrying twins, you and your babies may be tested and checked more than you would for a single-baby pregnancy. · At about 10 weeks, an ultrasound can show if the babies are growing in the same amniotic sac. If they each have their own sac and their own placenta, twins have the best chances of both growing well.   · Between weeks 18 and 20, an ultrasound may be able to show the sex of your babies. · Later in your pregnancy, you will start to have checkups more often. This will be sooner than for a single-baby pregnancy. Twins tend to be born early, but 37 weeks is a goal when mother and babies are doing well. As you get closer to delivery time, your medical team will help you know what to expect and what to do. As questions come to mind, keep a list so you can remember to ask your doctor. Follow-up care is a key part of your treatment and safety. Be sure to make and go to all appointments, and call your doctor if you are having problems. It's also a good idea to know your test results and keep a list of the medicines you take. Where can you learn more? Go to http://www.gray.com/  Enter Q309 in the search box to learn more about \"Learning About Twin Pregnancy. \"  Current as of: June 16, 2021               Content Version: 13.2  © 6407-1202 Healthwise, Incorporated. Care instructions adapted under license by [employer] (which disclaims liability or warranty for this information). If you have questions about a medical condition or this instruction, always ask your healthcare professional. [place] [address] any warranty or liability for your use of this information.

## 2022-04-29 NOTE — PROGRESS NOTES
Patient discharged to home stable with bleeding precautions. Follow up appointment next Thursday with her OB. Questions answered and encouraged. Patient verbalize understanding.

## 2022-04-29 NOTE — H&P
History & Physical    Name: Aruna Dorman MRN: [id number]  SSN: [id number]    YOB: 1985  Age: 40 y.o. Sex: female        Subjective:     Estimated Date of Delivery: 22  OB History    Para Term  AB Living   2 1 1     1   SAB IAB Ectopic Molar Multiple Live Births             1      # Outcome Date GA Lbr Michael/2nd Weight Sex Delivery Anes PTL Lv   2 Current            1 Term 12 40w2d  3.232 kg F Vag-Spont  N WES       Ms. Sana Leavitt is presenting with pregnancy at 18w3d with spotting. She noticed a small amount of pink bleeding when she wiped. She had been straining for a bowel movement after taking colace and miralax for constipation. Since arriving at [facility] she has only seen a small amount of brownish discharge with wiping. Prenatal course was complicated by advanced maternal age, twins and smoking. Please see prenatal records for details.     Past Medical History:   Diagnosis Date    Hypothyroidism     in  was on synthroid was told it was stress induced     Polycystic disease, ovaries     In past (resolved per patient)     Past Surgical History:   Procedure Laterality Date    HX CHOLECYSTECTOMY      IR CHOLECYSTOSTOMY PERCUTANEOUS       Social History     Occupational History    Not on file   Tobacco Use    Smoking status: Current Every Day Smoker     Packs/day: 0.25     Types: Cigarettes    Smokeless tobacco: Never Used    Tobacco comment: patient is vaping daily    Vaping Use    Vaping Use: Every day    Substances: Nicotine   Substance and Sexual Activity    Alcohol use: Not Currently    Drug use: Not Currently    Sexual activity: Yes     Birth control/protection: None     Family History   Problem Relation Age of Onset    Diabetes Mother     Stroke Mother     Diabetes Father     Cancer Maternal Grandmother     Mult Sclerosis Paternal Grandmother     Diabetes Brother     Breast Cancer Neg Hx     Ovarian Cancer Neg Hx     Uterine Cancer Neg Hx     Colon Cancer Neg Hx        Allergies   Allergen Reactions    Latex Rash    Banana Itching    Segundo Rash and Itching     Prior to Admission medications    Medication Sig Start Date End Date Taking? Authorizing Provider   cholecalciferol (Vitamin D3) 25 mcg (1,000 unit) cap Take  by mouth daily. Yes Provider, Historical   docusate sodium (COLACE) 50 mg capsule Take 1 Capsule by mouth two (2) times a day for 90 days. 4/23/22 7/22/22 Yes Rosibel Fraser MD   polyethylene glycol [facility]) 17 gram packet Take 1 Packet by mouth two (2) times a day. 4/23/22  Yes Rosibel Fraser MD   folic acid (FOLVITE) 379 mcg tablet Take 800 mcg by mouth daily. Yes Provider, Historical   prenatal vits62/FA/om3/dha/epa (PRENATAL GUMMY PO) Take  by mouth. Yes Provider, Historical   sertraline (ZOLOFT) 100 mg tablet Take 1 Tablet by mouth daily. 4/19/22  Yes Van Hidalgo MD   calcium carbonate/vitamin D3 (CALCIUM+D PO) Take  by mouth. Yes Provider, Historical   fexofenadine (ALLEGRA) 180 mg tablet Take  by mouth. Patient not taking: Reported on 4/28/2022    Provider, Historical        Review of Systems: A comprehensive review of systems was negative except for that written in the HPI. Objective:     Vitals:  Vitals:    04/28/22 2145   BP: 118/70   Pulse: 83   Resp: 20   Temp: 98 °F (36.7 °C)        Physical Exam:  No apparent distress  Abdomen: soft, non-tender  Membranes:  Intact  Fetal Heart Rate: Twin A 149, Twin B 149  Vaginal exam deferred. Prenatal Labs:   Lab Results   Component Value Date/Time    ABO/Rh(D) O POSITIVE 04/23/2022 07:23 AM         Assessment/Plan:     Active Problems:    Spotting affecting pregnancy in second trimester (4/28/2022)     Rh+ No active bleeding.  Patient reassured  D/C home    Plan:

## 2022-05-11 ENCOUNTER — HOSPITAL ENCOUNTER (OUTPATIENT)
Age: 37
Setting detail: OBSERVATION
LOS: 1 days | Discharge: HOME | End: 2022-05-13
Attending: OBSTETRICS & GYNECOLOGY | Admitting: OBSTETRICS & GYNECOLOGY
Payer: COMMERCIAL

## 2022-05-11 DIAGNOSIS — O26.872 CERVICAL SHORTENING AFFECTING PREGNANCY IN SECOND TRIMESTER: ICD-10-CM

## 2022-05-11 DIAGNOSIS — O30.042 DICHORIONIC DIAMNIOTIC TWIN PREGNANCY IN SECOND TRIMESTER: ICD-10-CM

## 2022-05-11 PROBLEM — O34.32 CERVICAL INSUFFICIENCY DURING PREGNANCY IN SECOND TRIMESTER, ANTEPARTUM: Status: ACTIVE | Noted: 2022-05-11

## 2022-05-11 PROBLEM — Z87.09 HISTORY OF DEVIATED NASAL SEPTUM: Status: ACTIVE | Noted: 2022-05-11

## 2022-05-11 PROBLEM — O09.529 AMA (ADVANCED MATERNAL AGE) MULTIGRAVIDA 35+: Status: ACTIVE | Noted: 2022-05-11

## 2022-05-11 LAB
ERYTHROCYTE [DISTWIDTH] IN BLOOD BY AUTOMATED COUNT: 13 % (ref 11.9–14.6)
HCT VFR BLD AUTO: 31.1 % (ref 35.8–46.3)
HGB BLD-MCNC: 10.3 G/DL (ref 11.7–15.4)
MCH RBC QN AUTO: 31.3 PG (ref 26.1–32.9)
MCHC RBC AUTO-ENTMCNC: 33.1 G/DL (ref 31.4–35)
MCV RBC AUTO: 94.5 FL (ref 79.6–97.8)
NRBC # BLD: 0 K/UL (ref 0–0.2)
PLATELET # BLD AUTO: 326 K/UL (ref 150–450)
PMV BLD AUTO: 9.4 FL (ref 9.4–12.3)
RBC # BLD AUTO: 3.29 M/UL (ref 4.05–5.2)
WBC # BLD AUTO: 11.8 K/UL (ref 4.3–11.1)

## 2022-05-11 PROCEDURE — G0378 HOSPITAL OBSERVATION PER HR: HCPCS

## 2022-05-11 PROCEDURE — 74011250636 HC RX REV CODE- 250/636: Performed by: OBSTETRICS & GYNECOLOGY

## 2022-05-11 PROCEDURE — 74011000258 HC RX REV CODE- 258: Performed by: OBSTETRICS & GYNECOLOGY

## 2022-05-11 PROCEDURE — 74011250637 HC RX REV CODE- 250/637: Performed by: OBSTETRICS & GYNECOLOGY

## 2022-05-11 PROCEDURE — 85027 COMPLETE CBC AUTOMATED: CPT

## 2022-05-11 RX ORDER — LANOLIN ALCOHOL/MO/W.PET/CERES
3 CREAM (GRAM) TOPICAL
Status: DISCONTINUED | OUTPATIENT
Start: 2022-05-11 | End: 2022-05-13 | Stop reason: HOSPADM

## 2022-05-11 RX ORDER — SERTRALINE HYDROCHLORIDE 100 MG/1
100 TABLET, FILM COATED ORAL DAILY
Status: DISCONTINUED | OUTPATIENT
Start: 2022-05-12 | End: 2022-05-11

## 2022-05-11 RX ORDER — SERTRALINE HYDROCHLORIDE 100 MG/1
100 TABLET, FILM COATED ORAL EVERY EVENING
Status: DISCONTINUED | OUTPATIENT
Start: 2022-05-11 | End: 2022-05-13 | Stop reason: HOSPADM

## 2022-05-11 RX ORDER — SODIUM CHLORIDE 0.9 % (FLUSH) 0.9 %
5-40 SYRINGE (ML) INJECTION AS NEEDED
Status: DISCONTINUED | OUTPATIENT
Start: 2022-05-11 | End: 2022-05-13 | Stop reason: HOSPADM

## 2022-05-11 RX ORDER — AZITHROMYCIN 250 MG/1
250 TABLET, FILM COATED ORAL DAILY
Status: DISCONTINUED | OUTPATIENT
Start: 2022-05-12 | End: 2022-05-13 | Stop reason: HOSPADM

## 2022-05-11 RX ORDER — AZITHROMYCIN 250 MG/1
500 TABLET, FILM COATED ORAL ONCE
Status: COMPLETED | OUTPATIENT
Start: 2022-05-11 | End: 2022-05-11

## 2022-05-11 RX ORDER — INDOMETHACIN 50 MG/1
50 CAPSULE ORAL EVERY 6 HOURS
Status: DISCONTINUED | OUTPATIENT
Start: 2022-05-11 | End: 2022-05-13 | Stop reason: HOSPADM

## 2022-05-11 RX ORDER — FAMOTIDINE 20 MG/1
20 TABLET, FILM COATED ORAL EVERY 12 HOURS
Status: DISCONTINUED | OUTPATIENT
Start: 2022-05-11 | End: 2022-05-13 | Stop reason: HOSPADM

## 2022-05-11 RX ORDER — SODIUM CHLORIDE 0.9 % (FLUSH) 0.9 %
5-40 SYRINGE (ML) INJECTION EVERY 8 HOURS
Status: DISCONTINUED | OUTPATIENT
Start: 2022-05-11 | End: 2022-05-13 | Stop reason: HOSPADM

## 2022-05-11 RX ORDER — METRONIDAZOLE 500 MG/100ML
500 INJECTION, SOLUTION INTRAVENOUS EVERY 12 HOURS
Status: DISCONTINUED | OUTPATIENT
Start: 2022-05-11 | End: 2022-05-13 | Stop reason: HOSPADM

## 2022-05-11 RX ADMIN — SERTRALINE HYDROCHLORIDE 100 MG: 100 TABLET ORAL at 21:07

## 2022-05-11 RX ADMIN — INDOMETHACIN 50 MG: 50 CAPSULE ORAL at 23:32

## 2022-05-11 RX ADMIN — METRONIDAZOLE 500 MG: 500 INJECTION, SOLUTION INTRAVENOUS at 15:10

## 2022-05-11 RX ADMIN — INDOMETHACIN 50 MG: 50 CAPSULE ORAL at 17:38

## 2022-05-11 RX ADMIN — FAMOTIDINE 20 MG: 20 TABLET, FILM COATED ORAL at 21:07

## 2022-05-11 RX ADMIN — AMPICILLIN SODIUM 1 G: 1 INJECTION, POWDER, FOR SOLUTION INTRAMUSCULAR; INTRAVENOUS at 21:07

## 2022-05-11 RX ADMIN — AMPICILLIN SODIUM 2 G: 2 INJECTION, POWDER, FOR SOLUTION INTRAMUSCULAR; INTRAVENOUS at 15:11

## 2022-05-11 RX ADMIN — AZITHROMYCIN MONOHYDRATE 500 MG: 250 TABLET ORAL at 15:10

## 2022-05-11 NOTE — PROGRESS NOTES
Admitted for cerclage. Admission assessment as documented. Oriented to room, call system and white board. Plan of care reviewed and questions answered. Consents signed, identification band verified and placed. IV placed, blood work drawn per order and sent to lab.

## 2022-05-12 ENCOUNTER — ANESTHESIA (OUTPATIENT)
Dept: LABOR AND DELIVERY | Age: 37
End: 2022-05-12
Payer: COMMERCIAL

## 2022-05-12 ENCOUNTER — ANESTHESIA EVENT (OUTPATIENT)
Dept: LABOR AND DELIVERY | Age: 37
End: 2022-05-12
Payer: COMMERCIAL

## 2022-05-12 PROCEDURE — 76210000064 HC RECOV POST SURG EA 0.5 HR: Performed by: OBSTETRICS & GYNECOLOGY

## 2022-05-12 PROCEDURE — 59320 REVISION OF CERVIX: CPT | Performed by: OBSTETRICS & GYNECOLOGY

## 2022-05-12 PROCEDURE — 74011250637 HC RX REV CODE- 250/637: Performed by: ANESTHESIOLOGY

## 2022-05-12 PROCEDURE — 74011000250 HC RX REV CODE- 250: Performed by: OBSTETRICS & GYNECOLOGY

## 2022-05-12 PROCEDURE — 77030005518 HC CATH URETH FOL 2W BARD -B: Performed by: OBSTETRICS & GYNECOLOGY

## 2022-05-12 PROCEDURE — 74011250637 HC RX REV CODE- 250/637: Performed by: OBSTETRICS & GYNECOLOGY

## 2022-05-12 PROCEDURE — 76010000389 HC LDRP PROCEDURE 0.5 TO 1 HR: Performed by: OBSTETRICS & GYNECOLOGY

## 2022-05-12 PROCEDURE — 74011000250 HC RX REV CODE- 250: Performed by: ANESTHESIOLOGY

## 2022-05-12 PROCEDURE — G0378 HOSPITAL OBSERVATION PER HR: HCPCS

## 2022-05-12 PROCEDURE — 74011250636 HC RX REV CODE- 250/636: Performed by: OBSTETRICS & GYNECOLOGY

## 2022-05-12 PROCEDURE — 74011250636 HC RX REV CODE- 250/636: Performed by: NURSE ANESTHETIST, CERTIFIED REGISTERED

## 2022-05-12 PROCEDURE — 2709999900 HC NON-CHARGEABLE SUPPLY: Performed by: OBSTETRICS & GYNECOLOGY

## 2022-05-12 PROCEDURE — 77030003665 HC NDL SPN BBMI -A: Performed by: NURSE ANESTHETIST, CERTIFIED REGISTERED

## 2022-05-12 PROCEDURE — 76060000032 HC ANESTHESIA 0.5 TO 1 HR: Performed by: OBSTETRICS & GYNECOLOGY

## 2022-05-12 PROCEDURE — 74011000250 HC RX REV CODE- 250: Performed by: NURSE ANESTHETIST, CERTIFIED REGISTERED

## 2022-05-12 PROCEDURE — 77030007880 HC KT SPN EPDRL BBMI -B: Performed by: NURSE ANESTHETIST, CERTIFIED REGISTERED

## 2022-05-12 PROCEDURE — 77030002986 HC SUT PROL J&J -A: Performed by: OBSTETRICS & GYNECOLOGY

## 2022-05-12 PROCEDURE — 74011000258 HC RX REV CODE- 258: Performed by: OBSTETRICS & GYNECOLOGY

## 2022-05-12 RX ORDER — PROMETHAZINE HYDROCHLORIDE 25 MG/1
25 TABLET ORAL
Status: DISCONTINUED | OUTPATIENT
Start: 2022-05-12 | End: 2022-05-13 | Stop reason: HOSPADM

## 2022-05-12 RX ORDER — ACETAMINOPHEN 500 MG
1000 TABLET ORAL
Status: DISCONTINUED | OUTPATIENT
Start: 2022-05-12 | End: 2022-05-13 | Stop reason: HOSPADM

## 2022-05-12 RX ORDER — TRISODIUM CITRATE DIHYDRATE AND CITRIC ACID MONOHYDRATE 500; 334 MG/5ML; MG/5ML
30 SOLUTION ORAL
Status: COMPLETED | OUTPATIENT
Start: 2022-05-12 | End: 2022-05-12

## 2022-05-12 RX ORDER — ONDANSETRON 2 MG/ML
4 INJECTION INTRAMUSCULAR; INTRAVENOUS
Status: DISCONTINUED | OUTPATIENT
Start: 2022-05-12 | End: 2022-05-13 | Stop reason: HOSPADM

## 2022-05-12 RX ORDER — ONDANSETRON 8 MG/1
8 TABLET, ORALLY DISINTEGRATING ORAL
Status: DISCONTINUED | OUTPATIENT
Start: 2022-05-12 | End: 2022-05-13 | Stop reason: HOSPADM

## 2022-05-12 RX ORDER — OXYCODONE HYDROCHLORIDE 5 MG/1
10 TABLET ORAL
Status: DISCONTINUED | OUTPATIENT
Start: 2022-05-12 | End: 2022-05-13 | Stop reason: HOSPADM

## 2022-05-12 RX ORDER — SODIUM CHLORIDE, SODIUM LACTATE, POTASSIUM CHLORIDE, CALCIUM CHLORIDE 600; 310; 30; 20 MG/100ML; MG/100ML; MG/100ML; MG/100ML
INJECTION, SOLUTION INTRAVENOUS
Status: DISCONTINUED | OUTPATIENT
Start: 2022-05-12 | End: 2022-05-12 | Stop reason: HOSPADM

## 2022-05-12 RX ORDER — BUPIVACAINE HYDROCHLORIDE 7.5 MG/ML
INJECTION, SOLUTION INTRASPINAL
Status: COMPLETED | OUTPATIENT
Start: 2022-05-12 | End: 2022-05-12

## 2022-05-12 RX ADMIN — PHENYLEPHRINE HYDROCHLORIDE 160 MCG: 10 INJECTION INTRAVENOUS at 11:38

## 2022-05-12 RX ADMIN — AMPICILLIN SODIUM 1 G: 1 INJECTION, POWDER, FOR SOLUTION INTRAMUSCULAR; INTRAVENOUS at 03:22

## 2022-05-12 RX ADMIN — SODIUM CITRATE AND CITRIC ACID MONOHYDRATE 30 ML: 500; 334 SOLUTION ORAL at 10:34

## 2022-05-12 RX ADMIN — METRONIDAZOLE 500 MG: 500 INJECTION, SOLUTION INTRAVENOUS at 03:52

## 2022-05-12 RX ADMIN — FAMOTIDINE 20 MG: 20 TABLET, FILM COATED ORAL at 20:16

## 2022-05-12 RX ADMIN — INDOMETHACIN 50 MG: 50 CAPSULE ORAL at 18:34

## 2022-05-12 RX ADMIN — INDOMETHACIN 50 MG: 50 CAPSULE ORAL at 12:13

## 2022-05-12 RX ADMIN — AMPICILLIN SODIUM 1 G: 1 INJECTION, POWDER, FOR SOLUTION INTRAMUSCULAR; INTRAVENOUS at 08:58

## 2022-05-12 RX ADMIN — SODIUM CHLORIDE, SODIUM LACTATE, POTASSIUM CHLORIDE, AND CALCIUM CHLORIDE: 600; 310; 30; 20 INJECTION, SOLUTION INTRAVENOUS at 11:16

## 2022-05-12 RX ADMIN — FAMOTIDINE 20 MG: 20 TABLET, FILM COATED ORAL at 08:58

## 2022-05-12 RX ADMIN — INDOMETHACIN 50 MG: 50 CAPSULE ORAL at 05:42

## 2022-05-12 RX ADMIN — BUPIVACAINE HYDROCHLORIDE IN DEXTROSE 10 MG: 7.5 INJECTION, SOLUTION SUBARACHNOID at 11:26

## 2022-05-12 RX ADMIN — AMPICILLIN SODIUM 1 G: 1 INJECTION, POWDER, FOR SOLUTION INTRAMUSCULAR; INTRAVENOUS at 14:58

## 2022-05-12 RX ADMIN — SERTRALINE HYDROCHLORIDE 100 MG: 100 TABLET ORAL at 18:34

## 2022-05-12 RX ADMIN — AZITHROMYCIN DIHYDRATE 250 MG: 250 TABLET, FILM COATED ORAL at 13:22

## 2022-05-12 RX ADMIN — METRONIDAZOLE 500 MG: 500 INJECTION, SOLUTION INTRAVENOUS at 15:33

## 2022-05-12 RX ADMIN — AMPICILLIN SODIUM 1 G: 1 INJECTION, POWDER, FOR SOLUTION INTRAMUSCULAR; INTRAVENOUS at 20:15

## 2022-05-12 RX ADMIN — SODIUM CHLORIDE, PRESERVATIVE FREE 10 ML: 5 INJECTION INTRAVENOUS at 22:00

## 2022-05-12 RX ADMIN — PHENYLEPHRINE HYDROCHLORIDE 160 MCG: 10 INJECTION INTRAVENOUS at 11:28

## 2022-05-12 RX ADMIN — ACETAMINOPHEN 1000 MG: 500 TABLET, FILM COATED ORAL at 17:01

## 2022-05-12 RX ADMIN — PHENYLEPHRINE HYDROCHLORIDE 160 MCG: 10 INJECTION INTRAVENOUS at 11:42

## 2022-05-12 RX ADMIN — PHENYLEPHRINE HYDROCHLORIDE 200 MCG: 10 INJECTION INTRAVENOUS at 11:46

## 2022-05-12 NOTE — PROGRESS NOTES
Patient admitted for cerclage at 20.2. LIZETH: 9/26/22. Chart reviewed - no needs identified in chart review. SW will continue to follow.     VARUN Murphy-SOL  [facility]   [phone]

## 2022-05-12 NOTE — PROGRESS NOTES
Patient request thomas removed. Called Dr Jeff Larios to make aware of patient request. Order received to d/c martha.

## 2022-05-12 NOTE — ANESTHESIA PREPROCEDURE EVALUATION
Relevant Problems   No relevant active problems       Anesthetic History               Review of Systems / Medical History  Patient summary reviewed and pertinent labs reviewed    Pulmonary          Smoker         Neuro/Psych              Cardiovascular                       GI/Hepatic/Renal                Endo/Other      Hypothyroidism       Other Findings   Comments: twin pregnancy           Physical Exam    Airway  Mallampati: II  TM Distance: > 6 cm  Neck ROM: normal range of motion   Mouth opening: Normal     Cardiovascular  Regular rate and rhythm,  S1 and S2 normal,  no murmur, click, rub, or gallop             Dental  No notable dental hx       Pulmonary  Breath sounds clear to auscultation               Abdominal         Other Findings            Anesthetic Plan    ASA: 2  Anesthesia type: spinal            Anesthetic plan and risks discussed with: Patient

## 2022-05-12 NOTE — ANESTHESIA PROCEDURE NOTES
Spinal Block    Start time: 5/12/2022 11:22 AM  End time: 5/12/2022 11:26 AM  Performed by: Donny Myers MD  Authorized by: oDnny Myers MD     Pre-procedure:   Indications: primary anesthetic  Preanesthetic Checklist: patient identified, risks and benefits discussed, anesthesia consent, site marked, patient being monitored and timeout performed    Timeout Time: 11:22 EDT          Spinal Block:   Patient Position:  Seated  Prep Region:  Lumbar  Prep: DuraPrep      Location:  L3-4  Technique:  Single shot    Local Dose (mL):  3    Needle:   Needle Type:  Pencan  Needle Gauge:  25 G  Attempts:  1      Events: CSF confirmed, no blood with aspiration and no paresthesia        Assessment:  Insertion:  Uncomplicated  Patient tolerance:  Patient tolerated the procedure well with no immediate complications

## 2022-05-12 NOTE — PROGRESS NOTES
Problem: Falls - Risk of  Goal: *Absence of Falls  Description: Document Sterling Asa Fall Risk and appropriate interventions in the flowsheet.   Outcome: Progressing Towards Goal  Note: Fall Risk Interventions:            Medication Interventions: Teach patient to arise slowly                   Problem: Incompetent Cervix  Goal: *Prevention/Cessation of labor signs/symptoms  Outcome: Progressing Towards Goal

## 2022-05-12 NOTE — OP NOTES
[facility] Cervical Cerclage Procedure Note  Pre-operative Diagnosis: Cervical Incompetence  Post-operative Diagnosis: Cervical Incompetence  Surgeon:  Will Cordova MD   Anesthesia: Spinal  Procedure: Procedure(s):  CERCLAGE  Indications:  Kristel Vinson is a  female with significant cervical shortening consistent with cervical incompetence. Cervical Cerclage was offered for treatment of cervical incompetence and the risks were explained in detail in the office and again in the hospital prior to surgery. These included risk of infection, bleeding, bladder and bowel damage and pregnancy loss, along with rupture of membranes now or later in pregnancy. These complications were all explained in detail and questions answered. It was explained to the patient that she had the option of expectant management without Cerclage placement. She understood that her care and treatment would not be affected by her choice and there was no pressure on her to choose this course of treatment. After a long discussion and clear understanding by the patient, the patient consented to the procedure prior to coming to the hospital and, again on admission to the hospital.   Procedure Details:   Kristel Vinson was taken to the Operating Room where spinal anesthetic was administered. The patient was then placed in the dorsal lithotomy position. The vulva and distal vagina were then gently prepped with Betadine solution and draped in a sterile fashion to expose the vaginal introitus. The patient was then placed in Trendelenburg position to allow better visualization of the vaginal canal and the cervix. An I and O  catheter was used to drain the urine from the bladder. Using retractors, the cervix and distal vagina were visualized. The vaginal portion of the cervical length was moderately shortened. The external cervical os appeared to be open, on digital exam, the internal os was dilated 1 cm. Cervix very soft.   Fetal membranes were not visualized. The cervix and distal vagina were again gently washed carefully with Betadine solution and dried with sterile sponges. A long atraumatic Allis clamp was used to retract the cervix by grasping a significant portio of the cervix, carefully placed to avoid membranes at the 10 o'clock position. Using 5mm Mersaline, the first bite of the Zhou stitch was placed at the 12 o'clock position on the cervix at the junction of the vaginal mucosa and the portio of the cervix with the suture placed through the cervical stroma, careful to avoid cervical canal and amniotic sac, between mucosa and cervical canal.  This procedure was repeated in 6  bites in a purse string fashion with sequential grasping and releasing of the cervix with the Allis clamp to retract the cervical stroma and allow for placement of suture at the junction of the vaginal mucosa and the portio of the cervix. Care was used in grasping the cervix to be atraumatic and to cause as little of trauma and bleeding as possible. The last stitch ended at approximately the 12 o'clock again, needle cut and both sutures were gently retracted to take up any slack in the suture and a finger placed in the cervix and retraction of both ends of suture and internal os noted to be closed with suture tension. The suture was evaluated visually in all quadrants and felt to be at the junction of the vaginal mucosa and the portio of the cervix without including any sidewall and without including bladder or bowel. The suture was then tied with 5 square knots. The cervix was checked again and visualized and the cervix remained pink with no significantly bleeding, internal os palpated and was tightly closed. A 2-0 silk suture was place through both ends of suture approximately 1 cm distal to knots ant then tied.   An air knot, approximately two cm in length was placed with three knots at the end of the air knot to allow grasping of the suture at the time of removal.   Cervix and vagina were again evaluated and no bleeding was noted. The cervix remained pink. Instruments and retractors were removed. The bladder was then drained and clear urine noted on drainage. Rectal exam was performed and no sutures were noted in the rectum. At the end of the procedure, sponge, instrument and needle counts were noted to be correct. Estimated Blood Loss:  Minimal  Suture Type: 5mm Mersaline  Number of Sutures: 1  Findings:  Short, soft cervix, external os open, internal os dilated 1 cm. Cerclage placed without complications.   Signed By: Sri Isbell MD 5/12/2022

## 2022-05-12 NOTE — PROGRESS NOTES
Bedside shift report given to Zain Estevez, [id number] Omer Palmer RN relinquishing care of pt at this time.

## 2022-05-12 NOTE — ANESTHESIA POSTPROCEDURE EVALUATION
Procedure(s):  CERCLAGE.    spinal    Anesthesia Post Evaluation      Multimodal analgesia: multimodal analgesia used between 6 hours prior to anesthesia start to PACU discharge  Patient location during evaluation: PACU  Patient participation: complete - patient participated  Level of consciousness: awake and alert  Pain management: adequate  Airway patency: patent  Anesthetic complications: no  Cardiovascular status: acceptable  Respiratory status: acceptable  Hydration status: acceptable  Post anesthesia nausea and vomiting:  none  Final Post Anesthesia Temperature Assessment:  Normothermia (36.0-37.5 degrees C)      INITIAL Post-op Vital signs:   Vitals Value Taken Time   /62 05/12/22 1432   Temp 36.7 °C (98.1 °F) 05/12/22 1215   Pulse 82 05/12/22 1432   Resp 16 05/12/22 1407   SpO2 97 % 05/12/22 1447

## 2022-05-12 NOTE — PROGRESS NOTES
FHR confirmed with simultaneous tracings.    Baby A: lower middle quadrant, 150's  Baby B: middle right quadrant: 155's

## 2022-05-13 VITALS
HEART RATE: 80 BPM | SYSTOLIC BLOOD PRESSURE: 126 MMHG | RESPIRATION RATE: 16 BRPM | TEMPERATURE: 98 F | OXYGEN SATURATION: 97 % | DIASTOLIC BLOOD PRESSURE: 67 MMHG

## 2022-05-13 PROCEDURE — 59025 FETAL NON-STRESS TEST: CPT

## 2022-05-13 PROCEDURE — 74011000258 HC RX REV CODE- 258: Performed by: OBSTETRICS & GYNECOLOGY

## 2022-05-13 PROCEDURE — 99217 PR OBSERVATION CARE DISCHARGE MANAGEMENT: CPT | Performed by: OBSTETRICS & GYNECOLOGY

## 2022-05-13 PROCEDURE — 74011250636 HC RX REV CODE- 250/636: Performed by: OBSTETRICS & GYNECOLOGY

## 2022-05-13 PROCEDURE — 74011250637 HC RX REV CODE- 250/637: Performed by: OBSTETRICS & GYNECOLOGY

## 2022-05-13 PROCEDURE — 74011000250 HC RX REV CODE- 250: Performed by: OBSTETRICS & GYNECOLOGY

## 2022-05-13 PROCEDURE — G0378 HOSPITAL OBSERVATION PER HR: HCPCS

## 2022-05-13 RX ORDER — FLUCONAZOLE 150 MG/1
150 TABLET ORAL DAILY
Qty: 2 TABLET | Refills: 3 | Status: SHIPPED | OUTPATIENT
Start: 2022-05-13

## 2022-05-13 RX ORDER — INDOMETHACIN 50 MG/1
50 CAPSULE ORAL EVERY 6 HOURS
Qty: 5 CAPSULE | Refills: 0 | Status: SHIPPED | OUTPATIENT
Start: 2022-05-13 | End: 2022-05-15

## 2022-05-13 RX ADMIN — FAMOTIDINE 20 MG: 20 TABLET, FILM COATED ORAL at 09:38

## 2022-05-13 RX ADMIN — SODIUM CHLORIDE, PRESERVATIVE FREE 10 ML: 5 INJECTION INTRAVENOUS at 03:28

## 2022-05-13 RX ADMIN — INDOMETHACIN 50 MG: 50 CAPSULE ORAL at 00:00

## 2022-05-13 RX ADMIN — AZITHROMYCIN DIHYDRATE 250 MG: 250 TABLET, FILM COATED ORAL at 09:38

## 2022-05-13 RX ADMIN — METRONIDAZOLE 500 MG: 500 INJECTION, SOLUTION INTRAVENOUS at 03:00

## 2022-05-13 RX ADMIN — AMPICILLIN SODIUM 1 G: 1 INJECTION, POWDER, FOR SOLUTION INTRAMUSCULAR; INTRAVENOUS at 03:28

## 2022-05-13 RX ADMIN — INDOMETHACIN 50 MG: 50 CAPSULE ORAL at 06:54

## 2022-05-13 RX ADMIN — AMPICILLIN SODIUM 1 G: 1 INJECTION, POWDER, FOR SOLUTION INTRAMUSCULAR; INTRAVENOUS at 09:38

## 2022-05-13 NOTE — PROGRESS NOTES
[facility] Antepartum Progress Note    40 y.o.  at 20w4d by Estimated Date of Delivery: 22. Patient admitted for cervical incompetence. She complains of nothing this am.     Patient denies HA, abdominal pain, vision changes. No regular contractions, LOF, VB. Good FM. Minimal edema. No chest pain or shortness of breath. No significant reflux, nausea, constipation, or other GI complaints. ROS per HPI, otherwise unremarkable.       Current Facility-Administered Medications:     oxyCODONE IR (ROXICODONE) tablet 10 mg, 10 mg, Oral, Q4H PRN, Fransico Restrepo MD    acetaminophen (TYLENOL) tablet 1,000 mg, 1,000 mg, Oral, Q8H PRN, Fransico Restrepo MD, 1,000 mg at 22 1701    promethazine (PHENERGAN) tablet 25 mg, 25 mg, Oral, Q6H PRN, Fransico Restrepo MD    ondansetron (ZOFRAN ODT) tablet 8 mg, 8 mg, Oral, Q8H PRN, Fransico Restrepo MD    ondansetron [facility]) injection 4 mg, 4 mg, IntraVENous, Q6H PRN, Fransico Restrepo MD    indomethacin (INDOCIN) capsule 50 mg, 50 mg, Oral, Q6H, Duy Pabon MD, 50 mg at 22 0654    [COMPLETED] ampicillin (OMNIPEN) 2 g in 0.9% sodium chloride (MBP/ADV) 100 mL MBP, 2 g, IntraVENous, ONCE, Stopped at 22 1600 **FOLLOWED BY** ampicillin (OMNIPEN) 1 g in 0.9% sodium chloride (MBP/ADV) 50 mL MBP, 1 g, IntraVENous, Q6H, Duy Pabon MD, Last Rate: 100 mL/hr at 22 0938, 1 g at 22 0938    [COMPLETED] azithromycin (ZITHROMAX) tablet 500 mg, 500 mg, Oral, ONCE, 500 mg at 22 1510 **FOLLOWED BY** azithromycin (ZITHROMAX) tablet 250 mg, 250 mg, Oral, DAILY, Duy Pabon MD, 250 mg at 22 7620    famotidine (PEPCID) tablet 20 mg, 20 mg, Oral, Q12H, Duy Pabon MD, 20 mg at 22 5417    melatonin tablet 3 mg, 3 mg, Oral, QHS PRN, Duy Pabon MD    metroNIDAZOLE (FLAGYL) IVPB premix 500 mg, 500 mg, IntraVENous, Q12H, Duy Pabon MD, Last Rate: 100 mL/hr at 22 0300, 500 mg at 22 0300    sodium chloride (NS) flush 5-40 mL, 5-40 mL, IntraVENous, Q8H, Libia Pabon MD, 10 mL at 22 2200    sodium chloride (NS) flush 5-40 mL, 5-40 mL, IntraVENous, PRN, Todd Pabon MD, 10 mL at 22 0328    sertraline (ZOLOFT) tablet 100 mg, 100 mg, Oral, QPM, Iman Sen MD, 100 mg at 22 1834    Vitals:  Patient Vitals for the past 24 hrs:   BP   22 0940 126/67   22 0655 124/68   22 0330 114/61   22 0002 (!) 111/59   22 2011 (!) 114/53   22 1432 108/62   22 1417 (!) 109/59   22 1407 123/65   22 1317 (!) 108/55   22 1305 (!) 108/58   22 1250 119/66     Temp (24hrs), Av.1 °F (36.7 °C), Min:98 °F (36.7 °C), Max:98.2 °F (36.8 °C)      I&O:  No intake/output data recorded.  1901 -  0700  In: 1950 [I.V.:1950]  Out: 1153 [Urine:1150]    Maternal and Fetal Monitoring:                              Uterine Activity: Frequency (min): mild irritability                           Fetal Heart Rate: Mode: ExternalFetal Heart Rate: 148       Labs:  CBC:    Recent Labs     22  1505 22  2259 22  0723 22  1015 22  1335   WBC 11.8* 11.2* 10.6 7.6 7.9   HGB 10.3* 10.8* 11.5* 12.8 12.8   HCT 31.1* 32.0* 34.2* 39.1 38.7    291 295 322 287       CMP:   Recent Labs     22  0723 22  1335    138   K 3.8 3.5   * 105   CO2 24 25   AGAP 8 8   GLU 92 88   BUN 8 7   CREA 0.53* 0.66   GFRAA >60 >60   GFRNA >60 >60   CA 8.7 8.9   ALB  --  3.3*   TP  --  7.3   GLOB  --  4.0*   AGRAT  --  0.8*   ALT  --  19       No results for input(s): URICA, LDH, PUQ in the last 7224 hours.     Recent Glucose Results:   Recent Labs     22  0723 22  1335   GLU 92 88         A/P: 40 y.o.  at 20w4d      Active Problems:    Dichorionic diamniotic twin pregnancy in second trimester (3/9/2022)      Overview: 3/9/2022: Di-Di twins noted on US FCAx2.             3/23/2022 at [facility]: Normal appearing Di/Di Twin pregnancy with normal NT       and nasal bone x 2.             4/19/2022 at [facility]: Normal early anatomy x 2, limited spine on Baby B d/t       position. Baby A: AC 84%, DVP 4.3 cm, Baby B: AC 53%, DVP 5.1 cm, CL 4.07 cm TV            5/11/2022 at [facility]: Normal anatomy and echo x 2; limited cardiac views. CL       1.57 cm. Low risk NIPT. Baby A: AC 97%, DVP 6 cm; Baby B: AC 93%, DVP 7 cm      AMA (advanced maternal age) multigravida 35+ (5/11/2022)      Cervical shortening affecting pregnancy in second trimester (5/11/2022)      Pt doing well postop, meeting all goals. Will dc home with plan for f/u with MFM in 5 days. Time:45 Minutes spent on floor,with greater than 50% of the time examining patient, explaining plan and coordinating care with nurse and requesting primary physician.

## 2022-05-13 NOTE — ROUTINE PROCESS
D/C instructions gone over with pt. Pt verbalized understanding. Pt left the unit ambulatory with her father.

## 2022-05-13 NOTE — PROGRESS NOTES
5/13/2022      RE: Jason Harding      To Whom it May Concern: This is to certify that Jason Harding was in the hospital on 5/13/2022. Please excuse her from work. Please feel free to contact my office if you have any questions or concerns. Thank you for your assistance in this matter.     Sincerely,      Malcolm Barrios RN

## 2022-05-13 NOTE — DISCHARGE SUMMARY
Antepartum Discharge Summary         Admit Date: 2022    Discharge Date: 2022     Admitting Physician: Apolinar Olivo MD     Admission Diagnoses: Short cervix affecting pregnancy [O26.879]; Cervical shortening affecting pregnancy in second trimester [O26.872]; Dichorionic diamniotic twin pregnancy in second trimester [O30.042]; AMA (advanced maternal age) multigravida 33+ [O09.529]    Discharge Diagnoses: Same as above           History of Present Illness:   OB History        2    Para   1    Term   1            AB        Living   1       SAB        IAB        Ectopic        Molar        Multiple        Live Births   1              40 y.o.  at 22w2d   Admitted for cervical shortening. Pregnancy complicated by twins and new cervical shortening. Hospital Course:   Patient was admitted and underwent emergent cervical cerclage placement. Tolerated well and met all pp goals. Patient Instructions:   Current Discharge Medication List      START taking these medications    Details   indomethacin (INDOCIN) 50 mg capsule Take 1 Capsule by mouth every six (6) hours for 5 doses. Qty: 5 Capsule, Refills: 0      fluconazole (Diflucan) 150 mg tablet Take 1 Tablet by mouth daily. FDA advises cautious prescribing of oral fluconazole in pregnancy. Indications: a yeast infection of the vagina and vulva  Qty: 2 Tablet, Refills: 3         CONTINUE these medications which have NOT CHANGED    Details   aspirin delayed-release 81 mg tablet Take 81 mg by mouth daily. fluticasone propionate (FLONASE) 50 mcg/actuation nasal spray 2 Sprays by Both Nostrils route daily. Qty: 1 Each, Refills: 1      fexofenadine (ALLEGRA) 180 mg tablet Take  by mouth. cholecalciferol (Vitamin D3) 25 mcg (1,000 unit) cap Take  by mouth daily. docusate sodium (COLACE) 50 mg capsule Take 1 Capsule by mouth two (2) times a day for 90 days.   Qty: 60 Capsule, Refills: 2      polyethylene glycol (MIRALAX) 17 gram packet Take 1 Packet by mouth two (2) times a day. Qty: 1 Each, Refills: 2      folic acid (FOLVITE) 943 mcg tablet Take 800 mcg by mouth daily. prenatal vits62/FA/om3/dha/epa (PRENATAL GUMMY PO) Take  by mouth. sertraline (ZOLOFT) 100 mg tablet Take 1 Tablet by mouth daily. Qty: 31 Tablet, Refills: 5    Associated Diagnoses: Anxiety during pregnancy      calcium carbonate/vitamin D3 (CALCIUM+D PO) Take  by mouth. See discharge instructions provided by nursing. Follow-up in 1 week.      Signed:  Alyssia Haro MD  5/13/2022  12:32 PM

## 2022-05-18 PROBLEM — O34.32 CERVICAL CERCLAGE SUTURE PRESENT IN SECOND TRIMESTER: Status: ACTIVE | Noted: 2022-05-11

## 2022-05-18 PROBLEM — Z86.69 HISTORY OF MIGRAINE DURING PREGNANCY: Status: ACTIVE | Noted: 2022-05-18

## 2022-05-18 PROBLEM — Z87.59 HISTORY OF MIGRAINE DURING PREGNANCY: Status: ACTIVE | Noted: 2022-05-18

## 2022-05-20 PROBLEM — O09.522 AMA (ADVANCED MATERNAL AGE) MULTIGRAVIDA 35+, SECOND TRIMESTER: Status: ACTIVE | Noted: 2022-03-09

## 2022-06-08 ENCOUNTER — ROUTINE PRENATAL (OUTPATIENT)
Dept: OBGYN CLINIC | Age: 37
End: 2022-06-08
Payer: COMMERCIAL

## 2022-06-08 VITALS
HEIGHT: 68 IN | BODY MASS INDEX: 31.83 KG/M2 | DIASTOLIC BLOOD PRESSURE: 60 MMHG | WEIGHT: 210 LBS | SYSTOLIC BLOOD PRESSURE: 128 MMHG

## 2022-06-08 DIAGNOSIS — O99.340 ANXIETY DURING PREGNANCY: ICD-10-CM

## 2022-06-08 DIAGNOSIS — O30.042 DICHORIONIC DIAMNIOTIC TWIN PREGNANCY IN SECOND TRIMESTER: ICD-10-CM

## 2022-06-08 DIAGNOSIS — Z86.69 HISTORY OF MIGRAINE DURING PREGNANCY: ICD-10-CM

## 2022-06-08 DIAGNOSIS — O34.32 CERVICAL CERCLAGE SUTURE PRESENT IN SECOND TRIMESTER: ICD-10-CM

## 2022-06-08 DIAGNOSIS — O09.522 AMA (ADVANCED MATERNAL AGE) MULTIGRAVIDA 35+, SECOND TRIMESTER: ICD-10-CM

## 2022-06-08 DIAGNOSIS — F41.9 ANXIETY DURING PREGNANCY: ICD-10-CM

## 2022-06-08 DIAGNOSIS — O99.332 SMOKING (TOBACCO) COMPLICATING PREGNANCY, SECOND TRIMESTER: ICD-10-CM

## 2022-06-08 DIAGNOSIS — Z87.59 HISTORY OF MIGRAINE DURING PREGNANCY: ICD-10-CM

## 2022-06-08 PROCEDURE — 99213 OFFICE O/P EST LOW 20 MIN: CPT | Performed by: OBSTETRICS & GYNECOLOGY

## 2022-06-08 RX ORDER — UREA 10 %
800 LOTION (ML) TOPICAL DAILY
COMMUNITY
End: 2022-09-02 | Stop reason: ALTCHOICE

## 2022-06-08 RX ORDER — SERTRALINE HYDROCHLORIDE 100 MG/1
TABLET, FILM COATED ORAL
COMMUNITY
Start: 2022-05-22 | End: 2022-08-05

## 2022-06-08 RX ORDER — ASPIRIN 81 MG/1
81 TABLET ORAL DAILY
COMMUNITY
End: 2022-09-02 | Stop reason: ALTCHOICE

## 2022-06-08 RX ORDER — POLYETHYLENE GLYCOL 3350 17 G/17G
17 POWDER, FOR SOLUTION ORAL 2 TIMES DAILY
COMMUNITY
Start: 2022-04-23 | End: 2022-07-12

## 2022-06-08 RX ORDER — FEXOFENADINE HCL 180 MG/1
TABLET ORAL
COMMUNITY

## 2022-06-08 RX ORDER — UREA 10 %
1 LOTION (ML) TOPICAL PRN
COMMUNITY
End: 2022-09-02 | Stop reason: ALTCHOICE

## 2022-06-08 RX ORDER — PROMETHAZINE HYDROCHLORIDE 25 MG/1
25 TABLET ORAL
COMMUNITY
Start: 2022-05-18

## 2022-06-08 RX ORDER — FLUTICASONE PROPIONATE 50 MCG
2 SPRAY, SUSPENSION (ML) NASAL DAILY
COMMUNITY
Start: 2022-05-11 | End: 2022-07-11

## 2022-06-08 ASSESSMENT — PATIENT HEALTH QUESTIONNAIRE - PHQ9: DEPRESSION UNABLE TO ASSESS: FUNCTIONAL CAPACITY MOTIVATION LIMITS ACCURACY

## 2022-06-08 NOTE — PROGRESS NOTES
Patient comes in today for routine prenatal visit. No complaints/concerns today.      Fetal Movement: Yes  Contractions: No  Vaginal Bleeding: No  Leaking Fluid: No  GI/: No    Vitals:    06/08/22 0913   BP: 128/60   Site: Left Upper Arm   Position: Sitting   Weight: 210 lb (95.3 kg)   Height: 5' 8\" (1.727 m)

## 2022-06-08 NOTE — ASSESSMENT & PLAN NOTE
Encouraged patient to discontinue tobacco use as soon as possible. Discussed with patient risks in pregnancy including but not limited to placental abruption, oligohydramnios, intrauterine growth restriction and intrauterine death.   Approximately 4 minutes spent in face to face counseling

## 2022-06-08 NOTE — PROGRESS NOTES
Chief Complaint   Patient presents with    Routine Prenatal Visit        This 40 y.o. Rodolfo Ware at 24w2d with Estimated Date of Delivery: 22 presents for routine prenatal visit. Patient has no complaints today. Pt reports good FM, no LOF, VB, ctx. Pt denies H/A, vision changes, abdom pain, N/V. Vitals:    22 0913   BP: 128/60   Site: Left Upper Arm   Position: Sitting   Weight: 210 lb (95.3 kg)   Height: 5' 8\" (1.727 m)        Patient Active Problem List    Diagnosis Date Noted    History of migraine during pregnancy 2022     Overview Note:     2022 at [facility]: Patient reports vomiting from Migraine on 22; Rx for Phenergan sent to patient's preferred pharmacy.  History of deviated nasal septum 2022     Overview Note:     2022 at [facility]: Pt request Flonase- prescription sent to patient's   preferred pharmacy.  Cervical shortening affecting pregnancy in second trimester 2022    Cervical cerclage suture present in second trimester 2022     Overview Note:     2022 at [facility]: CL 1.57 cm; Patient sent to [facility] for rescue cerclage on   2022 at [facility]: CL 3.19 cm normal with minimal funneling. Still working, note given to reduce hours to 8/day and 32 hours weekly. · Letter given to patient to reduce work hours. Assessment & Plan Note:     Seeing MFM tomorrow      Anxiety during pregnancy 2022     Overview Note:     3/23/2022 at [facility]: Patient states her   last May and her mother  this January. Feels anxious at times and tearful at today visit. 2022 at [facility]: Pt reports improved mood since starting Zoloft 50 mg, but still struggles with anxiety/stress, would like to increase dose. 2022 at [facility]: Pt currently taking Zoloft 100 mg. Stable, no SI/HI. 2022 at [facility]: Pt currently taking Zoloft 100mg. Stable, no SI/HI.             Assessment & Plan Note:     Stable on current regimen      Dichorionic diamniotic Genitourinary    Cervical cerclage suture present in second trimester     Seeing MFM tomorrow            Other    Anxiety during pregnancy     Stable on current regimen         Dichorionic diamniotic twin pregnancy in second trimester     noted         Smoking (tobacco) complicating pregnancy, second trimester     Encouraged patient to discontinue tobacco use as soon as possible. Discussed with patient risks in pregnancy including but not limited to placental abruption, oligohydramnios, intrauterine growth restriction and intrauterine death. Approximately 4 minutes spent in face to face counseling          History of migraine during pregnancy    AMA (advanced maternal age) multigravida 35+, second trimester     Educated patient of signs and symptoms of  labor including but not limited to regular uterine contractions every 5-7 minutes for 1 hour, vaginal bleeding or leakage of fluid to seek immediate care.                  Eber Roper MD     9:32 AM    22

## 2022-06-09 ENCOUNTER — ROUTINE PRENATAL (OUTPATIENT)
Dept: OBGYN CLINIC | Age: 37
End: 2022-06-09
Payer: COMMERCIAL

## 2022-06-09 VITALS — SYSTOLIC BLOOD PRESSURE: 116 MMHG | DIASTOLIC BLOOD PRESSURE: 65 MMHG

## 2022-06-09 DIAGNOSIS — O99.340 ANXIETY DURING PREGNANCY: ICD-10-CM

## 2022-06-09 DIAGNOSIS — O36.62X2: ICD-10-CM

## 2022-06-09 DIAGNOSIS — O30.042 DICHORIONIC DIAMNIOTIC TWIN PREGNANCY IN SECOND TRIMESTER: Primary | ICD-10-CM

## 2022-06-09 DIAGNOSIS — F41.9 ANXIETY DURING PREGNANCY: ICD-10-CM

## 2022-06-09 DIAGNOSIS — O34.32 CERVICAL CERCLAGE SUTURE PRESENT IN SECOND TRIMESTER: ICD-10-CM

## 2022-06-09 DIAGNOSIS — O36.62X1: ICD-10-CM

## 2022-06-09 DIAGNOSIS — O40.2XX2 POLYHYDRAMNIOS IN SECOND TRIMESTER, ANTEPARTUM COMPLICATION, FETUS 2: ICD-10-CM

## 2022-06-09 DIAGNOSIS — Z87.59 HISTORY OF MIGRAINE DURING PREGNANCY: ICD-10-CM

## 2022-06-09 DIAGNOSIS — O26.872 CERVICAL SHORTENING AFFECTING PREGNANCY IN SECOND TRIMESTER: ICD-10-CM

## 2022-06-09 DIAGNOSIS — O09.522 AMA (ADVANCED MATERNAL AGE) MULTIGRAVIDA 35+, SECOND TRIMESTER: ICD-10-CM

## 2022-06-09 DIAGNOSIS — O99.332 SMOKING (TOBACCO) COMPLICATING PREGNANCY, SECOND TRIMESTER: ICD-10-CM

## 2022-06-09 DIAGNOSIS — Z3A.24 24 WEEKS GESTATION OF PREGNANCY: ICD-10-CM

## 2022-06-09 DIAGNOSIS — Z86.69 HISTORY OF MIGRAINE DURING PREGNANCY: ICD-10-CM

## 2022-06-09 PROCEDURE — 99214 OFFICE O/P EST MOD 30 MIN: CPT | Performed by: OBSTETRICS & GYNECOLOGY

## 2022-06-09 RX ORDER — FAMOTIDINE 20 MG/1
20 TABLET, FILM COATED ORAL 2 TIMES DAILY
Qty: 60 TABLET | Refills: 3 | Status: SHIPPED | OUTPATIENT
Start: 2022-06-09

## 2022-06-09 ASSESSMENT — PATIENT HEALTH QUESTIONNAIRE - PHQ9
SUM OF ALL RESPONSES TO PHQ QUESTIONS 1-9: 2
SUM OF ALL RESPONSES TO PHQ9 QUESTIONS 1 & 2: 2
SUM OF ALL RESPONSES TO PHQ QUESTIONS 1-9: 2
2. FEELING DOWN, DEPRESSED OR HOPELESS: 1
1. LITTLE INTEREST OR PLEASURE IN DOING THINGS: 1

## 2022-06-09 NOTE — PROGRESS NOTES
MFM Consultation    Johanna Conway (: 1985) is a 40 y.o.  at 24w3d with 2022, by Other Basis. Presents for evaluation of the following chief complaint(s):  Ultrasound and High Risk Pregnancy (AMA, tobacco use, anxiety, dichorionic diamniotic twin pregnancy, cerclage)     Patient is working full time; 4 days a week . Scheduled to see primary OB Kory Stamp) on 2022. Patient reports one migraine since last appointment relieved by Excedrin. No edema. Denies preeclamptic symptoms. Reports good fetal movement. No bleeding, LOF, cramping, ctxs, or vaginal pressure. Doing fine with work, 8hr/day. Good familial support. Interval history reviewed. Review of Systems - per HPI; otherwise unremarkable. Exam:     Vitals:    22 1201   BP: 116/65     Ultrasound: Please see formal ultrasound report under imaging tab. ASSESSMENT/PLAN:  Patient Active Problem List    Diagnosis Date Noted    Dichorionic diamniotic twin pregnancy in second trimester 2022     Priority: High     3/9/2022: Di-Di twins. 2022 at [facility]: Normal anatomy and echo x 2; limited cardiac views. CL 1.57 cm. Low risk NIPT. Baby A: AC 97%, DVP 6 cm; Baby B: AC 93%, DVP 7 cm.  22 at [facility]: Excessive fetal growth x2; Twin A: AC 85%, Overall 60% DVP 5.6 cm, Dopplers WNL. Twin B: Polyhydramnios. AC 84%, Overall 52%, DVP 9.3 cm, Dopplers WNL    · Instructed to limit carb intake  · Glucola scheduled for 22  · No follow up MFM-refer back as needed            AMA (advanced maternal age) multigravida 35+, second trimester 2022     Priority: High     EDC by LMP confirmed by 11 5 week US    PLAN:  NIPT, MFM referral, Start ASA at 12-36 weeks, early/late glucola,  testing at 32 weeks  3/9/2020: NIPT neg x 3, CF/SMA negative  See Di/Di Twin Overview.         Polyhydramnios in second trimester, antepartum complication, fetus 2      Priority: Medium    Excessive fetal growth affecting management of pregnancy in second trimester, fetus 2 2022     Priority: Medium    Excessive fetal growth affecting pregnancy in second trimester, fetus 1 2022     Priority: Medium    Cervical shortening affecting pregnancy in second trimester 2022     Priority: Low    Anxiety during pregnancy 2022     Priority: Low     3/23/2022 at [facility]: Patient states her   last May and her mother  this January. Feels anxious at times and tearful at today visit. 2022 at [facility]: Pt reports improved mood since starting Zoloft 50 mg, but still struggles with anxiety/stress, would like to increase dose. 2022 at [facility]: Pt currently taking Zoloft 100 mg. Stable, no SI/HI. 2022 at [facility]: Pt currently taking Zoloft 100mg. Stable, no SI/HI.  Smoking (tobacco) complicating pregnancy, second trimester 2022     Priority: Low     3/9/2022: Pt down to 3 cigs/day (from 1 PPD). Encouraged continued cessation  3/23/2022 at [facility]: Patient reports smoking at most 3 cigs/day with vaping. Pt vapes constantly. Encouraged continue cessation  2022 at [facility]: Pt reports that she is down to 5 cigs per month, but continues to vapes daily. Provided literature from [facility] about the dangers of vaping today. 2022 at [facility]: Pt reports smoking 3-5 cigarettes per month, and no longer vapes. Patient counseled on smoking during pregnancy. 2022 at [facility]: Pt reports smoking has increased to 5 cigarettes a day. Patient counseled on smoking during pregnancy.  History of migraine during pregnancy 2022 at [facility]: Patient reports vomiting from Migraine on 22; Rx for Phenergan sent to patient's preferred pharmacy.  History of deviated nasal septum 2022 at [facility]: Pt request Flonase- prescription sent to patient's   preferred pharmacy.          Cervical cerclage suture present in second trimester 05/11/2022 5/11/2022 at [facility]: CL 1.57 cm; Patient sent to [facility] for rescue cerclage on PG  5/18/2022 at [facility]: CL 3.19 cm normal with minimal funneling. Still working, note given to reduce hours to 8/day and 32 hours weekly. · Letter given to patient to reduce work hours. D/D twins- reassuring  PTL precautions  OK for hydrotherapy/baths/pool     GCT next OB visit. Reviewed risk of GDM with AMA twins. Addressed normal pregnancy complaints, reassured and offered suggestions for care  Reviewed gestational age precautions and activity goals/limitations  Nutritional counseling as well as specific goals based on current maternal and fetal status  Options for GERD therapy if becomes symptomatic over course of pregnancy  Gestational age appropriate preventive care regarding communicable disease transmission and vaccines as appropriate (including flu, TDaP, and COVID.)  Additional plans and concerns as documented in problem list.   All questions answered and concerns discussed. An electronic signature was used to authenticate this note.   Yennifer Tracey MD    I have spent 34 minutes reviewing previous notes, test results and face to face with the patient discussing the diagnosis and importance of compliance with the treatment plan, in addition to ultrasound findings, as well as documenting on the day of the visit (6/9/2022)    Return in 3 weeks and Twin Growth      Patient Active Problem List   Diagnosis Code    Anxiety during pregnancy O99.340, F41.9    Dichorionic diamniotic twin pregnancy in second trimester O35.18    Smoking (tobacco) complicating pregnancy, second trimester O99.332    History of deviated nasal septum Z87.09    Cervical shortening affecting pregnancy in second trimester O26.872    Cervical cerclage suture present in second trimester O34.32    History of migraine during pregnancy Z86.69, Z87.59    AMA (advanced maternal age) multigravida 35+, second trimester O09.522   Robyn Ball Polyhydramnios in second trimester, antepartum complication, fetus 2 L78.2PK0    Excessive fetal growth affecting management of pregnancy in second trimester, fetus 2 O36.62X2    Excessive fetal growth affecting pregnancy in second trimester, fetus 1 S71.29U3

## 2022-06-24 ENCOUNTER — TELEPHONE (OUTPATIENT)
Dept: OBGYN CLINIC | Age: 37
End: 2022-06-24

## 2022-06-24 ENCOUNTER — HOSPITAL ENCOUNTER (OUTPATIENT)
Age: 37
Discharge: HOME | End: 2022-06-24
Attending: OBSTETRICS & GYNECOLOGY | Admitting: OBSTETRICS & GYNECOLOGY
Payer: COMMERCIAL

## 2022-06-24 VITALS — SYSTOLIC BLOOD PRESSURE: 115 MMHG | TEMPERATURE: 98.2 F | RESPIRATION RATE: 17 BRPM | DIASTOLIC BLOOD PRESSURE: 67 MMHG

## 2022-06-24 PROBLEM — N89.8 VAGINAL DISCHARGE DURING PREGNANCY: Status: ACTIVE | Noted: 2022-06-24

## 2022-06-24 PROBLEM — O26.899 VAGINAL DISCHARGE DURING PREGNANCY: Status: ACTIVE | Noted: 2022-06-24

## 2022-06-24 PROCEDURE — 99281 EMR DPT VST MAYX REQ PHY/QHP: CPT

## 2022-06-24 PROCEDURE — 99283 EMERGENCY DEPT VISIT LOW MDM: CPT

## 2022-06-24 NOTE — PROGRESS NOTES
Pt was seen in triage at [facility] on 6/24/2022. Please excuse her from work until Monday 6/27/2022.     Thank you  Carol Renee RN

## 2022-06-24 NOTE — ED TRIAGE NOTES
History & Physical    Name: Mary Sharma MRN: [id number]  SSN: [id number]    YOB: 1985  Age: 40 y.o. Sex: female      Subjective:     Reason for Triage visit:  Pregnancy and pink mucus discharge per patient. History of Present Illness: Ms. Gracia Paulino is a 40 y.o.  female with an estimated gestational age of 30w1d with Estimated Date of Delivery: 22. Patient complains of pink mucusy discharge. Cerclage in place. Denies contractions, lof, vb or decreased FM. for 1 days. Pregnancy has been complicated by twins and shortened cervix with cerclage in place. . Patient denies abdominal pain  , contractions, fever, headache , nausea and vomiting, pelvic pressure, shortness of breath, swelling, vaginal bleeding , vaginal leaking of fluid  and visual disturbances. Not sexually active since short cervix found February.   OB History    Para Term  AB Living   2 1 1     1   SAB IAB Ectopic Molar Multiple Live Births             1      # Outcome Date GA Lbr Adriano/2nd Weight Sex Delivery Anes PTL Lv   2 Current            1 Term 12 40w2d  7 lb 2 oz (3.232 kg) F Vag-Spont  N MIKEL     Past Medical History:   Diagnosis Date    Hypothyroidism     in  was on synthroid was told it was stress induced     Polycystic disease, ovaries     In past (resolved per patient)     Past Surgical History:   Procedure Laterality Date    CHOLECYSTECTOMY      IR CHOLECYSTOSTOMY PERCUTANEOUS COMPLETE      REVISION CERVIX [facility]           Social History     Occupational History    Not on file   Tobacco Use    Smoking status: Current Every Day Smoker     Packs/day: 0.25    Smokeless tobacco: Never Used    Tobacco comment: Quit smoking: Pt quit vaping   Vaping Use    Vaping Use: Former   Substance and Sexual Activity    Alcohol use: Not Currently    Drug use: Not Currently    Sexual activity: Not on file      Family History   Problem Relation Age of Onset    Diabetes noted.  Slightly pink mucus noted but no source of bleeding. No evidence of chorio/ abruption/ infection  Membranes:  Intact  Uterine Activity:  None  Fetal Heart Rate:  Age appropriate without decels,        Lab/Data Review:  No results found for this or any previous visit (from the past 24 hour(s)). Assessment and Plan:     Pink discharge with no evidence of labor, ROM ,distress or infection. Labor precautions given. Patient was told to return to LD immediately if she experiences contractions in a pattern, loss of fluids, vaginal bleeding or decreased Fetal movement.

## 2022-06-24 NOTE — TELEPHONE ENCOUNTER
Patient calls stating she is having a bloody discharge with her cerclage. She left two voicemails asking what she should do. Patient was advised to go to Triage to be evaluated. Patient voiced understanding.

## 2022-06-27 ENCOUNTER — HOSPITAL ENCOUNTER (OUTPATIENT)
Age: 37
Discharge: HOME | End: 2022-06-27
Attending: OBSTETRICS & GYNECOLOGY | Admitting: OBSTETRICS & GYNECOLOGY
Payer: COMMERCIAL

## 2022-06-27 VITALS
RESPIRATION RATE: 18 BRPM | HEART RATE: 121 BPM | TEMPERATURE: 98.3 F | SYSTOLIC BLOOD PRESSURE: 110 MMHG | DIASTOLIC BLOOD PRESSURE: 65 MMHG

## 2022-06-27 PROBLEM — O46.90 VAGINAL BLEEDING DURING PREGNANCY: Status: ACTIVE | Noted: 2022-06-27

## 2022-06-27 PROCEDURE — 99283 EMERGENCY DEPT VISIT LOW MDM: CPT

## 2022-06-27 RX ORDER — ACETAMINOPHEN 325 MG/1
650 TABLET ORAL EVERY 4 HOURS PRN
Status: DISCONTINUED | OUTPATIENT
Start: 2022-06-27 | End: 2022-06-27 | Stop reason: HOSPADM

## 2022-06-27 NOTE — PROGRESS NOTES
Pt to [facility] for complaints of vaginal bleeding. Pt states bleeding started this am after using the restroom. Pt placed on EFM and assessment started.

## 2022-06-27 NOTE — ED TRIAGE NOTES
OB ED Provider Note    Name: Johanna Conway MRN: [id number]     YOB: 1985  Age: 40 y.o. Sex: female      Subjective:     Reason for Presentation to [place] ED:  vaginal spotting    History of Present Illness: Ms. Avril Avila is a 40 y.o.  at 30w0d gestation with Estimated Date of Delivery: 22. Her current obstetrical history is significant for multiple gestation (di/di twins) and shortened cervix with cerclage in place. Prenatal records reviewed. She was here 3 days ago with pink mucus discharge and was discharged home without incident. This morning had blood on toilet paper several times in a row (none on pad) and was concerned. She reports good fetal movement. She denies leakage of fluid. She reports contractions: none.     OB History    Para Term  AB Living   2 1 1     1   SAB IAB Ectopic Molar Multiple Live Births             1      # Outcome Date GA Lbr Adriano/2nd Weight Sex Delivery Anes PTL Lv   2 Current            1 Term 12 40w2d  7 lb 2 oz (3.232 kg) F Vag-Spont  N MIKEL     Past Medical History:   Diagnosis Date    Hypothyroidism     in  was on synthroid was told it was stress induced     Polycystic disease, ovaries     In past (resolved per patient)     Past Surgical History:   Procedure Laterality Date    CHOLECYSTECTOMY      IR CHOLECYSTOSTOMY PERCUTANEOUS COMPLETE      REVISION CERVIX [facility]           Social History     Occupational History    Not on file   Tobacco Use    Smoking status: Current Every Day Smoker     Packs/day: 0.25    Smokeless tobacco: Never Used    Tobacco comment: Quit smoking: Pt quit vaping   Vaping Use    Vaping Use: Former   Substance and Sexual Activity    Alcohol use: Not Currently    Drug use: Not Currently    Sexual activity: Not on file        Allergies   Allergen Reactions    Latex Rash    Banana Itching    African Brenton [Irvingia Gabonensis] Itching     Prior to Admission medications Medication Sig Start Date End Date Taking? Authorizing Provider   famotidine (PEPCID) 20 MG tablet Take 1 tablet by mouth 2 times daily 6/9/22   Mitul Gao MD   calcium carbonate (OS-MOE) 1250 (500 Ca) MG chewable tablet Take 1 tablet by mouth as needed    Historical Provider, MD   vitamin D 25 MCG (1000 UT) CAPS Take by mouth daily    Historical Provider, MD   sertraline (ZOLOFT) 100 MG tablet TAKE 1 TABLET BY MOUTH EVERY DAY 5/22/22   Historical Provider, MD   fluticasone [facility]) 50 MCG/ACT nasal spray 2 sprays by Nasal route daily 5/11/22   Historical Provider, MD   fexofenadine (ALLEGRA) 180 MG tablet Take by mouth    Historical Provider, MD   folic acid (FOLVITE) 196 MCG tablet Take 800 mcg by mouth daily    Historical Provider, MD   aspirin 81 MG EC tablet Take 81 mg by mouth daily    Historical Provider, MD   docusate sodium (COLACE) 50 MG capsule Take 50 mg by mouth 2 times daily 4/23/22 7/22/22  Historical Provider, MD   polyethylene glycol (GLYCOLAX) 17 GM/SCOOP powder Take 17 g by mouth 2 times daily 4/23/22   Historical Provider, MD   promethazine (PHENERGAN) 25 MG tablet Take 25 mg by mouth 5/18/22   Historical Provider, MD          Objective:     Vitals:  /65   Pulse (!) 121   Temp 98.3 °F (36.8 °C) (Oral)   Resp 18          Wt Readings from Last 1 Encounters:   06/08/22 210 lb (95.3 kg)       Physical Exam:  Vital signs normal  FHTs: Gestational age appropriate without decels  Uterine activity/Contractions: None  Membranes: intact   SSE/SVE: closed, cerlage not on tension. Knot noted 12 oclock. Minimal brown blood noted at knot. No evidence of chorio/ abruption/ infection    Lab/Data Review & Summary:  No results found for this or any previous visit (from the past 24 hour(s)).     Assessment:  27w0d gestation  Not in labor; Spotting from cervical irritation with intact cerclage  Obstetrical history also significant for multiple gestation (di/di twins)  Reassuring fetal status      Plan:  discharge home, follow-up at next regular OB appointment

## 2022-06-27 NOTE — PROGRESS NOTES
BABY B unable to be traced on EFM, doppler used and heart rate 135 with audible accelerations. Doppler used for 6minutes to assess heart rate.

## 2022-06-29 ENCOUNTER — ROUTINE PRENATAL (OUTPATIENT)
Dept: OBGYN CLINIC | Age: 37
End: 2022-06-29
Payer: COMMERCIAL

## 2022-06-29 VITALS — SYSTOLIC BLOOD PRESSURE: 112 MMHG | DIASTOLIC BLOOD PRESSURE: 59 MMHG

## 2022-06-29 DIAGNOSIS — O40.2XX2 POLYHYDRAMNIOS IN SECOND TRIMESTER, ANTEPARTUM COMPLICATION, FETUS 2: ICD-10-CM

## 2022-06-29 DIAGNOSIS — Z87.59 HISTORY OF MIGRAINE DURING PREGNANCY: ICD-10-CM

## 2022-06-29 DIAGNOSIS — O99.340 ANXIETY DURING PREGNANCY: ICD-10-CM

## 2022-06-29 DIAGNOSIS — O99.332 SMOKING (TOBACCO) COMPLICATING PREGNANCY, SECOND TRIMESTER: ICD-10-CM

## 2022-06-29 DIAGNOSIS — O99.012 ANEMIA AFFECTING PREGNANCY IN SECOND TRIMESTER: ICD-10-CM

## 2022-06-29 DIAGNOSIS — O34.32 CERVICAL CERCLAGE SUTURE PRESENT IN SECOND TRIMESTER: ICD-10-CM

## 2022-06-29 DIAGNOSIS — O36.62X1: ICD-10-CM

## 2022-06-29 DIAGNOSIS — Z86.69 HISTORY OF MIGRAINE DURING PREGNANCY: ICD-10-CM

## 2022-06-29 DIAGNOSIS — O30.042 DICHORIONIC DIAMNIOTIC TWIN PREGNANCY IN SECOND TRIMESTER: ICD-10-CM

## 2022-06-29 DIAGNOSIS — O26.872 CERVICAL SHORTENING AFFECTING PREGNANCY IN SECOND TRIMESTER: ICD-10-CM

## 2022-06-29 DIAGNOSIS — O09.522 AMA (ADVANCED MATERNAL AGE) MULTIGRAVIDA 35+, SECOND TRIMESTER: Primary | ICD-10-CM

## 2022-06-29 DIAGNOSIS — F41.9 ANXIETY DURING PREGNANCY: ICD-10-CM

## 2022-06-29 DIAGNOSIS — O36.62X2: ICD-10-CM

## 2022-06-29 PROBLEM — Z87.09 HISTORY OF DEVIATED NASAL SEPTUM: Status: RESOLVED | Noted: 2022-05-11 | Resolved: 2022-06-29

## 2022-06-29 PROBLEM — N89.8 VAGINAL DISCHARGE DURING PREGNANCY: Status: RESOLVED | Noted: 2022-06-24 | Resolved: 2022-06-29

## 2022-06-29 PROBLEM — O26.899 VAGINAL DISCHARGE DURING PREGNANCY: Status: RESOLVED | Noted: 2022-06-24 | Resolved: 2022-06-29

## 2022-06-29 PROBLEM — O46.90 VAGINAL BLEEDING DURING PREGNANCY: Status: RESOLVED | Noted: 2022-06-27 | Resolved: 2022-06-29

## 2022-06-29 PROCEDURE — 76817 TRANSVAGINAL US OBSTETRIC: CPT | Performed by: OBSTETRICS & GYNECOLOGY

## 2022-06-29 PROCEDURE — 99215 OFFICE O/P EST HI 40 MIN: CPT | Performed by: OBSTETRICS & GYNECOLOGY

## 2022-06-29 PROCEDURE — 76816 OB US FOLLOW-UP PER FETUS: CPT | Performed by: OBSTETRICS & GYNECOLOGY

## 2022-06-29 RX ORDER — IRON/FOLIC AC/VIT BCOMP,C/MIN 106 MG-1MG
TABLET ORAL
Qty: 30 TABLET | Refills: 5 | Status: SHIPPED | OUTPATIENT
Start: 2022-06-29

## 2022-06-29 RX ORDER — DOCUSATE SODIUM 100 MG/1
100 CAPSULE, LIQUID FILLED ORAL 2 TIMES DAILY PRN
Qty: 60 CAPSULE | Refills: 5 | Status: SHIPPED | OUTPATIENT
Start: 2022-06-29 | End: 2022-09-02 | Stop reason: ALTCHOICE

## 2022-06-29 ASSESSMENT — PATIENT HEALTH QUESTIONNAIRE - PHQ9
1. LITTLE INTEREST OR PLEASURE IN DOING THINGS: 0
SUM OF ALL RESPONSES TO PHQ QUESTIONS 1-9: 0
SUM OF ALL RESPONSES TO PHQ9 QUESTIONS 1 & 2: 0
SUM OF ALL RESPONSES TO PHQ QUESTIONS 1-9: 0
2. FEELING DOWN, DEPRESSED OR HOPELESS: 0
SUM OF ALL RESPONSES TO PHQ QUESTIONS 1-9: 0
SUM OF ALL RESPONSES TO PHQ QUESTIONS 1-9: 0

## 2022-06-29 NOTE — LETTER
[facility] MATERNAL FETAL MEDICINE  [address] [place] [address] [address] [address]  Phone: [phone]  Fax: [phone]      June 29, 2022     Patient: Cata Pinto   YOB: 1985   Date of Visit: 6/29/2022       To Whom It May Concern: It is my medical opinion that Sarabjit Marie should remain out of work until end of pregnancy. .    If you have any questions or concerns, please don't hesitate to call.     Sincerely,        Dali Rich MD

## 2022-06-29 NOTE — PROGRESS NOTES
[facility] Consultation    Seven Stafford (: 1985) is a 40 y.o. Falkner Muscat at 27w3d with 2022, by Other Basis. Presents for evaluation of the following chief complaint(s):  Ultrasound and High Risk Pregnancy (AMA, tobacco use, anxiety, Di/Di twins, cerclage)     Patient is working full time; 4 days a week . Scheduled to see primary OB Ladonna Robles) on 2022.     Patient reports one migraine since last appointment relieved by Excedrin. No edema.  Denies preeclamptic symptoms.  Reports good fetal movement.  No bleeding, LOF, cramping, ctxs, or vaginal pressure. Doing fine with work, 8hr/day. Good familial support. Interval history since prior appt reviewed and updated as indicated. Review of Systems - per HPI; otherwise unremarkable. Exam:     Vitals:    22 1138   BP: (!) 112/59       Ultrasound: Please see formal ultrasound report under imaging tab. ASSESSMENT/PLAN:  Patient Active Problem List    Diagnosis Date Noted    Cervical cerclage suture present in second trimester 2022     Priority: High     2022 at [facility]: CL 1.57 cm; Patient sent to [facility] for rescue cerclage on PG  2022 at [facility]: CL 3.19 cm normal with minimal funneling. Still working, note given to reduce hours to 8/day and 32 hours weekly. 22 at [facility]: Cerclage removal scheduled for 22 at 10am. Pt given instructions  · Letter given to patient to reduce work hours. [facility] Dichorionic diamniotic twin pregnancy in second trimester 2022     Priority: High     3/9/2022: Di-Di twins. 2022 at [facility]: Normal anatomy and echo x 2; limited cardiac views. CL 1.57 cm. Low risk NIPT. Baby A: AC 97%, DVP 6 cm; Baby B: AC 93%, DVP 7 cm.  22 at [facility]: Excessive fetal growth x2; Twin A: AC 85%, Overall 60% DVP 5.6 cm, Dopplers WNL. Twin B: Polyhydramnios. AC 84%, Overall 52%, DVP 9.3 cm, Dopplers WNL  22 at [facility]: Appropriate fetal growthx2; Twin B with Polyhydramnios.  Twin A:  AC 55 %, DVP 5 cm, Dopplers WNL Twin B: Poly, AC 62%, DVP 10cm, Dopplers WNL    · Glucola scheduled for 22-will also do iron studies that day-CC  · F/U MFM 3 weeks Twin growth  · Letter given for pt to come out of work for remainder of pregnancy  · Will have Sachi Ontiveros reach out to pt due to coming out of work and doesn't have FMLA            Anemia affecting pregnancy in second trimester 2022     Priority: Medium     22 Hgb 10.3   22 at [facility]: Anemia noted on recent lab studies. Physiologic anemia of pregnancy is usually mild. Start iron supplementation today. Rx given. ? Counseled to take iron with vitamin C, and to avoid taking with iron rich meals. ?   Take with stool softener, if not included in iron formulation. Check CBC in 4 weeks with TIBC and serum ferritin. ? If Hgb<9.5 or low iron stores, refer to hematology for consult and iron infusions. · Please draw CBC, reticulocyte count, TIBC, transferrin saturation, serum ferritin at next OB appt/prior to referral to hematology. -CC  If not performed previously, check hemoglobin electrophoresis.  Polyhydramnios in second trimester, antepartum complication, fetus 2      Priority: Medium    Anxiety during pregnancy 2022     Priority: Medium     3/23/2022 at [facility]: Patient states her   last May and her mother  this January. Feels anxious at times and tearful at today visit. 2022 at [facility]: Pt reports improved mood since starting Zoloft 50 mg, but still struggles with anxiety/stress, would like to increase dose. 2022 at [facility]: Pt currently taking Zoloft 100 mg. Stable, no SI/HI. 2022 at [facility]: Pt currently taking Zoloft 100mg. Stable, no SI/HI. · Prescription sent for Zoloft 100 mg daily, will probably need to increase dose to 150 mgs by 3rd trimester.            AMA (advanced maternal age) multigravida 33+, second trimester 2022     Priority: Medium     EDC by LMP confirmed by 11  week US    PLAN:  NIPT, MFM referral, Start ASA at 12-36 weeks, early/late glucola,  testing at 28 weeks  3/9/2020: NIPT neg x 3, CF/SMA negative  See Di/Di Twin Overview.  History of migraine during pregnancy 2022     Priority: Low     2022 at [facility]: Patient reports vomiting from Migraine on 22; Rx for Phenergan sent to patient's preferred pharmacy.  Cervical shortening affecting pregnancy in second trimester 2022     Priority: Low    Smoking (tobacco) complicating pregnancy, second trimester 2022     Priority: Low     3/9/2022: Pt down to 3 cigs/day (from 1 PPD). Encouraged continued cessation  3/23/2022 at [facility]: Patient reports smoking at most 3 cigs/day with vaping. Pt vapes constantly. Encouraged continue cessation  2022 at [facility]: Pt reports that she is down to 5 cigs per month, but continues to vapes daily. Provided literature from [facility] about the dangers of vaping today. 2022 at [facility]: Pt reports smoking 3-5 cigarettes per month, and no longer vapes. Patient counseled on smoking during pregnancy. 2022 at [facility]: Pt reports smoking has increased to 5 cigarettes a day. Patient counseled on smoking during pregnancy. Encouraged patient to discontinue tobacco use as soon as possible. Discussed with patient risks in pregnancy including but not limited to placental abruption, oligohydramnios, intrauterine growth restriction and intrauterine death.   Approximately 4 minutes spent in face to face counseling          Addressed normal pregnancy complaints, reassured and offered suggestions for care  Reviewed gestational age precautions and activity goals/limitations  Nutritional counseling as well as specific goals based on current maternal and fetal status  Options for GERD therapy if becomes symptomatic over course of pregnancy  Gestational age appropriate preventive care regarding communicable disease transmission and vaccines as appropriate (including flu, TDaP, and COVID.)  Additional plans and concerns as documented in problem list.   All questions answered and concerns discussed. An electronic signature was used to authenticate this note.   Tigist Graham MD    I have spent 40 minutes reviewing previous notes, test results and face to face with the patient discussing the diagnosis and importance of compliance with the treatment plan, in addition to ultrasound findings, as well as documenting on the day of the visit (6/29/2022)    Return in 3 weeks and Twin Growth      Patient Active Problem List   Diagnosis Code    Anxiety during pregnancy O99.340, F41.9    Dichorionic diamniotic twin pregnancy in second trimester O35.18    Smoking (tobacco) complicating pregnancy, second trimester O99.332    Cervical shortening affecting pregnancy in second trimester O26.872    Cervical cerclage suture present in second trimester O34.32    History of migraine during pregnancy Z86.69, Z87.59    AMA (advanced maternal age) multigravida 35+, second trimester O09.522    Polyhydramnios in second trimester, antepartum complication, fetus 2 F20.3UN9    Anemia affecting pregnancy in second trimester O99.012

## 2022-06-29 NOTE — LETTER
[facility] MATERNAL FETAL MEDICINE  [address] [place] [address] [address] [address]  Phone: [phone]  Fax: [phone]      June 29, 2022       RE: Eulogio Castellanos  1985      To whom it may concern: We are following Eulogio Castellanos for a high risk pregnancy. We are recommending she come out of work for the remainder of the pregnancy due to complications. Khushboo quigley    Thank you for your cooperation in this matter. If you have any further questions, feel free to call our office. Sincerely yours,        Jenifer Montanez RN  [facility] Maternal Fetal Medicine    To Whom It May Concern: It is my medical opinion that John Pichardo should remain out of work until the end of pregancy. If you have any questions or concerns, please don't hesitate to call.     Sincerely,        Maury Rivera MD

## 2022-06-30 ENCOUNTER — FOLLOWUP TELEPHONE ENCOUNTER (OUTPATIENT)
Dept: CASE MANAGEMENT | Age: 37
End: 2022-06-30

## 2022-06-30 NOTE — TELEPHONE ENCOUNTER
SW consult received. Phone call to patient at [phone]. Patient was recently taken out of work due to twin pregnancy requiring a cerclage. Patient was previously working at [employer], and her employment has now been terminated. Patient does not have short term disability or FMLA benefits. Discussed current financial stressors. Patient is not receiving SNAP/[employer]. She is currently receiving WIC. Patient states that her 7 y/o daughter and 10 y/o \"bonus\" daughter both receive death benefits from their father. This source of income covers most of their \"major bills\" (rent, car payment, electricity). Patient does not currently have any eviction/cut off notices. The father of her twins \"hasn't offered anything\" in the means of financial support, and patient's family is unable to provide support financially. SW will text patient information on how to apply for SNAP/WIC as well as information on local food sommer. Patient confirms that she's on Zoloft due to depression/anxiety secondary to loss of  in May 2021 and loss of mother in January 2022. She states that the Zoloft \"is actually working and I can feel my emotions. \"  Patient denied the need for counseling resources at this time. LUIS will continue to follow.     RAJEEV Godinez, [address] [facility]   [phone]

## 2022-07-05 ENCOUNTER — HOSPITAL ENCOUNTER (OUTPATIENT)
Age: 37
Discharge: HOME | End: 2022-07-05
Attending: OBSTETRICS & GYNECOLOGY | Admitting: OBSTETRICS & GYNECOLOGY
Payer: COMMERCIAL

## 2022-07-05 ENCOUNTER — TELEPHONE (OUTPATIENT)
Dept: OBGYN CLINIC | Age: 37
End: 2022-07-05

## 2022-07-05 VITALS
HEIGHT: 67 IN | RESPIRATION RATE: 18 BRPM | WEIGHT: 213 LBS | BODY MASS INDEX: 33.43 KG/M2 | SYSTOLIC BLOOD PRESSURE: 124 MMHG | DIASTOLIC BLOOD PRESSURE: 58 MMHG | TEMPERATURE: 98.7 F | HEART RATE: 105 BPM

## 2022-07-05 PROBLEM — O46.93 VAGINAL BLEEDING IN PREGNANCY, THIRD TRIMESTER: Status: ACTIVE | Noted: 2022-07-05

## 2022-07-05 PROCEDURE — 99285 EMERGENCY DEPT VISIT HI MDM: CPT

## 2022-07-05 PROCEDURE — 59025 FETAL NON-STRESS TEST: CPT

## 2022-07-05 RX ORDER — ONDANSETRON 2 MG/ML
4 INJECTION INTRAMUSCULAR; INTRAVENOUS EVERY 6 HOURS PRN
Status: DISCONTINUED | OUTPATIENT
Start: 2022-07-05 | End: 2022-07-05 | Stop reason: HOSPADM

## 2022-07-05 RX ORDER — ACETAMINOPHEN 325 MG/1
650 TABLET ORAL EVERY 4 HOURS PRN
Status: DISCONTINUED | OUTPATIENT
Start: 2022-07-05 | End: 2022-07-05 | Stop reason: HOSPADM

## 2022-07-05 RX ORDER — ONDANSETRON 4 MG/1
4 TABLET, ORALLY DISINTEGRATING ORAL EVERY 8 HOURS PRN
Status: DISCONTINUED | OUTPATIENT
Start: 2022-07-05 | End: 2022-07-05 | Stop reason: HOSPADM

## 2022-07-05 NOTE — TELEPHONE ENCOUNTER
Patient LM stating she woke 7 am and when wiping noticed blood and mucus. Patient states the last two time she went to ER over a weekend she was told to wait 2 hours to see if it subsided. She stated they also told her it could be from her cerclage. I discussed with Ericka. She recommended patient go to hospital. Patient informed and voiced understanding. Patient then stated the bleeding subsided when she laid down but started back when she woke up. Patient advised to go to ER. Patient voiced understanding.  flori

## 2022-07-05 NOTE — PROGRESS NOTES
Pt to room [facility] for triage with chief complaint of vaginal spotting. Cerclage in place. Twin pregnancy. Assessment begins, EFM and [place] applied to a soft non tender abdomen and tracing well. Dr Annika Balbuena called to assess patient.

## 2022-07-05 NOTE — H&P
History & Physical    Name: Liane Maki MRN: [id number]  SSN: [id number]    YOB: 1985  Age: 40 y.o. Sex: female        Subjective: Vaginal bleeding  39 yo  presents at 28+1 wks with vaginal bleeding in pregnancy c/b di/di twins and cervical cerclage. The pt started having dark brown spotting this AM. She then said it became red tinged. The bleeding was just noted on the tissue, not in her underwear. She denies cramping or ctxs. She reports normal FM. She has been observing pelvic rest. She reports some recent strong coughing due to allergies. She denies vomiting. She has also had some constipation with straining. Her pregnancy has been c/b AMA, polyhydramnios baby B, anemia, cervical shortening, anxiety, and smoking. She has been seen multiple times for vaginal bleeding in pregnancy. Blood type is O pos. Estimated Date of Delivery: 22  OB History    Para Term  AB Living   2 1 1     1   SAB IAB Ectopic Molar Multiple Live Births             1      # Outcome Date GA Lbr Adriano/2nd Weight Sex Delivery Anes PTL Lv   2 Current            1 Term 12 40w2d  7 lb 2 oz (3.232 kg) F Vag-Spont  N MIKEL       Past Medical History:   Diagnosis Date    Anemia affecting pregnancy in second trimester 2022    Excessive fetal growth affecting management of pregnancy in second trimester, fetus 2 2022    Excessive fetal growth affecting pregnancy in second trimester, fetus 1 2022    History of deviated nasal septum 2022 at [facility]: Pt request Flonase- prescription sent to patient's  preferred pharmacy.       Hypothyroidism     in  was on synthroid was told it was stress induced     Polycystic disease, ovaries     In past (resolved per patient)    Vaginal bleeding during pregnancy 2022    Vaginal discharge during pregnancy 2022     Past Surgical History:   Procedure Laterality Date    CHOLECYSTECTOMY      IR CHOLECYSTOSTOMY PERCUTANEOUS COMPLETE      REVISION CERVIX [facility]      2022     Social History     Occupational History    Not on file   Tobacco Use    Smoking status: Current Every Day Smoker     Packs/day: 0.25    Smokeless tobacco: Never Used    Tobacco comment: Quit smoking: Pt quit vaping   Vaping Use    Vaping Use: Former   Substance and Sexual Activity    Alcohol use: Not Currently    Drug use: Not Currently    Sexual activity: Not on file     Family History   Problem Relation Age of Onset    Diabetes Brother     Breast Cancer Neg Hx     Ovarian Cancer Neg Hx     Mult Sclerosis Paternal Grandmother     Cancer Maternal Grandmother     Diabetes Father     Stroke Mother     Diabetes Mother     Uterine Cancer Neg Hx     Colon Cancer Neg Hx        Allergies   Allergen Reactions    Latex Rash    Banana Itching    African Brenton [Irvingia Gabonensis] Itching     Prior to Admission medications    Medication Sig Start Date End Date Taking?  Authorizing Provider   Karlene Fum-FA-B Zya-Q-Du-Mg-Mn-Cu (FERROCITE PLUS) 106-1 MG TABS Take 1 tab daily as instructed 6/29/22   Aretha Flores MD   docusate sodium (COLACE) 100 MG capsule Take 1 capsule by mouth 2 times daily as needed for Constipation 6/29/22   Aretha Flores MD   Prenatal MV-Min-Fe Fum-FA-DHA (PRENATAL 1 PO) Take by mouth    Historical Provider, MD   famotidine (PEPCID) 20 MG tablet Take 1 tablet by mouth 2 times daily 6/9/22   Lidia Brown MD   calcium carbonate (OS-MOE) 1250 (500 Ca) MG chewable tablet Take 1 tablet by mouth as needed    Historical Provider, MD   vitamin D 25 MCG (1000 UT) CAPS Take by mouth daily    Historical Provider, MD   sertraline (ZOLOFT) 100 MG tablet TAKE 1 TABLET BY MOUTH EVERY DAY 5/22/22   Historical Provider, MD   fluticasone [facility]) 50 MCG/ACT nasal spray 2 sprays by Nasal route daily 5/11/22   Historical Provider, MD   fexofenadine (ALLEGRA) 180 MG tablet Take by mouth    Historical Provider, MD   folic acid (FOLVITE) 800 MCG tablet Take 800 mcg by mouth daily    Historical Provider, MD   aspirin 81 MG EC tablet Take 81 mg by mouth daily    Historical Provider, MD   docusate sodium (COLACE) 50 MG capsule Take 50 mg by mouth 2 times daily 22  Historical Provider, MD   polyethylene glycol (GLYCOLAX) 17 GM/SCOOP powder Take 17 g by mouth 2 times daily  Patient not taking: Reported on 2022   Historical Provider, MD   promethazine (PHENERGAN) 25 MG tablet Take 25 mg by mouth  Patient not taking: Reported on 2022   Historical Provider, MD        Review of Systems: Pertinent items are noted in HPI. 10 point ROS is otherwise positive for general discomforts in pregnancy. Objective:     Vitals:  Vitals:    22 1434   BP: (!) 124/58   Pulse: (!) 105   Resp: 18   Temp: 98.7 °F (37.1 °C)   TempSrc: Oral   Weight: 213 lb (96.6 kg)   Height: 5' 7\" (1.702 m)        Physical Exam:   Patient without distress  Abdomen: soft, nontender  Fundus: soft,:non tender  Perineum: blood absent, amniotic fluidabsent  Cervical Exam: SSE: NEFG, normal vaginal mucosa, dark blood noted at cervix and external os. Cerclage visible and intact. Cervix LTC to visual inspection. Lower Extremities:  - Edema 1+  TVUS: Cervix averaged 2.9cm on 3 measurements. TAUS showed A olique lie head maternal left, B transverse lie head maternal right. Membranes:  Intact  Fetal Heart Rate: Baseline: A 130s, B 140s per minute  Variability: moderate x 2  Accelerations: yes x 2  Decelerations: none x 2  Uterine contractions: none    Prenatal Labs:   Lab Results   Component Value Date/Time    ABORH O POSITIVE 2022 07:23 AM         Assessment/Plan: 41 yo  at 28+1wks presents with vaginal bleeding in pregnancy c/b dizygotic twin gestation and cervical shortening with cerclage in place. Cervical evaluation was reassuring. I reviewed and interpreted the NST as a category 1 tracing for both babies.       Principal Problem:    Vaginal bleeding in pregnancy, third trimester  Resolved Problems:    * No resolved hospital problems. *       Plan:   Discharge to home. Routine OB follow up. Return for any increase in vb, ctxs, or any other concerns.    Continue to observe pelvic rest.

## 2022-07-11 RX ORDER — FLUTICASONE PROPIONATE 50 MCG
SPRAY, SUSPENSION (ML) NASAL
Qty: 1 EACH | Refills: 1 | Status: SHIPPED | OUTPATIENT
Start: 2022-07-11

## 2022-07-12 ENCOUNTER — ROUTINE PRENATAL (OUTPATIENT)
Dept: OBGYN CLINIC | Age: 37
End: 2022-07-12
Payer: COMMERCIAL

## 2022-07-12 VITALS
BODY MASS INDEX: 33.59 KG/M2 | DIASTOLIC BLOOD PRESSURE: 68 MMHG | HEIGHT: 67 IN | WEIGHT: 214 LBS | SYSTOLIC BLOOD PRESSURE: 126 MMHG

## 2022-07-12 DIAGNOSIS — O99.012 ANEMIA AFFECTING PREGNANCY IN SECOND TRIMESTER: ICD-10-CM

## 2022-07-12 DIAGNOSIS — O26.873 CERVICAL SHORTENING AFFECTING PREGNANCY IN THIRD TRIMESTER: ICD-10-CM

## 2022-07-12 DIAGNOSIS — O30.043 DICHORIONIC DIAMNIOTIC TWIN PREGNANCY IN THIRD TRIMESTER: Primary | ICD-10-CM

## 2022-07-12 DIAGNOSIS — O09.523 AMA (ADVANCED MATERNAL AGE) MULTIGRAVIDA 35+, THIRD TRIMESTER: ICD-10-CM

## 2022-07-12 DIAGNOSIS — O40.2XX2 POLYHYDRAMNIOS IN SECOND TRIMESTER, ANTEPARTUM COMPLICATION, FETUS 2: ICD-10-CM

## 2022-07-12 DIAGNOSIS — O34.33 CERVICAL CERCLAGE SUTURE PRESENT IN THIRD TRIMESTER: ICD-10-CM

## 2022-07-12 DIAGNOSIS — O30.042 DICHORIONIC DIAMNIOTIC TWIN PREGNANCY IN SECOND TRIMESTER: ICD-10-CM

## 2022-07-12 DIAGNOSIS — O99.333 SMOKING (TOBACCO) COMPLICATING PREGNANCY, THIRD TRIMESTER: ICD-10-CM

## 2022-07-12 DIAGNOSIS — O40.3XX2 POLYHYDRAMNIOS IN THIRD TRIMESTER COMPLICATION, FETUS 2 OF MULTIPLE GESTATION: ICD-10-CM

## 2022-07-12 DIAGNOSIS — O36.63X2: ICD-10-CM

## 2022-07-12 PROCEDURE — 76819 FETAL BIOPHYS PROFIL W/O NST: CPT | Performed by: NURSE PRACTITIONER

## 2022-07-12 PROCEDURE — 76817 TRANSVAGINAL US OBSTETRIC: CPT | Performed by: NURSE PRACTITIONER

## 2022-07-12 PROCEDURE — 99213 OFFICE O/P EST LOW 20 MIN: CPT | Performed by: NURSE PRACTITIONER

## 2022-07-12 NOTE — PROGRESS NOTES
I have reviewed the patient's visit today including history, exam and assessment by CHON Keen. I agree with treatment/plan as above.     Fidencio Don MD  11:25 AM  07/12/22

## 2022-07-12 NOTE — ASSESSMENT & PLAN NOTE
Stephanie, cbc today  PTL/labor precautions, [address], and pregnancy warning signs reviewed. Pt advised to call the office at [phone] or go straight to Labor and Delivery at [address] with any of the following concerns vaginal bleeding, leaking of fluid, curtis regularly Q 5-7 minutes for over an hour or not feeling the baby move.    RTO 2 weeks OBV with TDAP

## 2022-07-12 NOTE — PROGRESS NOTES
This is a 40 y.o.   at 29w1d for routine OB visit. Her Estimated Due Date is 2022, by Other Basis    Denies leaking of fluid, vaginal bleeding, or regular contractions. Pt has been seen at triage for VB/spotting in the last 2 weeks. Reports fetal movement. Current Outpatient Medications on File Prior to Visit   Medication Sig Dispense Refill    fluticasone (FLONASE) 50 MCG/ACT nasal spray SPRAY 2 SPRAYS INTO EACH NOSTRIL EVERY DAY 1 each 1    Fe Fum-FA-B Vvv-Z-Hx-Mg-Mn-Cu (FERROCITE PLUS) 106-1 MG TABS Take 1 tab daily as instructed 30 tablet 5    docusate sodium (COLACE) 100 MG capsule Take 1 capsule by mouth 2 times daily as needed for Constipation 60 capsule 5    Prenatal MV-Min-Fe Fum-FA-DHA (PRENATAL 1 PO) Take by mouth      famotidine (PEPCID) 20 MG tablet Take 1 tablet by mouth 2 times daily 60 tablet 3    calcium carbonate (OS-MOE) 1250 (500 Ca) MG chewable tablet Take 1 tablet by mouth as needed      vitamin D 25 MCG (1000 UT) CAPS Take by mouth daily      sertraline (ZOLOFT) 100 MG tablet TAKE 1 TABLET BY MOUTH EVERY DAY      fexofenadine (ALLEGRA) 180 MG tablet Take by mouth      folic acid (FOLVITE) 168 MCG tablet Take 800 mcg by mouth daily      aspirin 81 MG EC tablet Take 81 mg by mouth daily      docusate sodium (COLACE) 50 MG capsule Take 50 mg by mouth 2 times daily      promethazine (PHENERGAN) 25 MG tablet Take 25 mg by mouth        No current facility-administered medications on file prior to visit.        Allergies   Allergen Reactions    Latex Rash    Banana Itching    African Brenton [Irvingia Gabonensis] Itching       OB History    Para Term  AB Living   2 1 1 0 0 1   SAB IAB Ectopic Molar Multiple Live Births   0 0 0 0 0 1       # 1 - Date: 12, Sex: Female, Weight: 7 lb 2 oz (3.232 kg), GA: 40w2d, Delivery: Vaginal, Spontaneous, Apgar1: None, Apgar5: None, Living: Living, Birth Comments: None    # 2 - Date: None, Sex: None, Weight: None, GA: None, Delivery: None, Apgar1: None, Apgar5: None, Living: None, Birth Comments: None        Past Medical History:   Diagnosis Date    Anemia affecting pregnancy in second trimester 6/29/2022    Excessive fetal growth affecting management of pregnancy in second trimester, fetus 2 6/9/2022    Excessive fetal growth affecting pregnancy in second trimester, fetus 1 6/9/2022    History of deviated nasal septum 5/11/2022 5/11/2022 at [facility]: Pt request Flonase- prescription sent to patient's  preferred pharmacy.  Hypothyroidism     in 2000 was on synthroid was told it was stress induced     Polycystic disease, ovaries     In past (resolved per patient)    Vaginal bleeding during pregnancy 6/27/2022    Vaginal discharge during pregnancy 6/24/2022       Past Surgical History:   Procedure Laterality Date    CHOLECYSTECTOMY      IR CHOLECYSTOSTOMY PERCUTANEOUS COMPLETE      REVISION CERVIX [facility] [address]      2022       Family History   Problem Relation Age of Onset    Diabetes Brother     Breast Cancer Neg Hx     Ovarian Cancer Neg Hx     Mult Sclerosis Paternal Grandmother     Cancer Maternal Grandmother     Diabetes Father     Stroke Mother     Diabetes Mother     Uterine Cancer Neg Hx     Colon Cancer Neg Hx        Social History     Socioeconomic History    Marital status:       Spouse name: Not on file    Number of children: Not on file    Years of education: Not on file    Highest education level: Not on file   Occupational History    Not on file   Tobacco Use    Smoking status: Current Every Day Smoker     Packs/day: 0.25    Smokeless tobacco: Never Used    Tobacco comment: Quit smoking: Pt quit vaping   Vaping Use    Vaping Use: Former   Substance and Sexual Activity    Alcohol use: Not Currently    Drug use: Not Currently    Sexual activity: Not on file   Other Topics Concern    Not on file   Social History Narrative    Abuse: Feels safe at home, no history of physical abuse, no history of sexual abuse      Social Determinants of Health     Financial Resource Strain:     Difficulty of Paying Living Expenses: Not on file   Food Insecurity:     Worried About Running Out of Food in the Last Year: Not on file    [place] of Food in the Last Year: Not on file   Transportation Needs:     Lack of Transportation (Medical): Not on file    Lack of Transportation (Non-Medical):  Not on file   Physical Activity:     Days of Exercise per Week: Not on file    Minutes of Exercise per Session: Not on file   Stress:     Feeling of Stress : Not on file   Social Connections:     Frequency of Communication with Friends and Family: Not on file    Frequency of Social Gatherings with Friends and Family: Not on file    Attends Hindu Services: Not on file    Active Member of [address] [place] [address] [employer] or Organizations: Not on file    Attends Club or Organization Meetings: Not on file    Marital Status: Not on file   Intimate Partner Violence:     Fear of Current or Ex-Partner: Not on file    Emotionally Abused: Not on file    Physically Abused: Not on file    Sexually Abused: Not on file   Housing Stability:     Unable to Pay for Housing in the Last Year: Not on file    Number of [place] in the Last Year: Not on file    Unstable Housing in the Last Year: Not on file           Objective    Vitals:    07/12/22 1005   BP: 126/68   Site: Left Upper Arm   Position: Sitting   Weight: 214 lb (97.1 kg)   Height: 5' 7\" (1.702 m)       General: well developed, well nourished, in no acute distress    Head: normocephalic and atraumatic    Resp: even and unlabored    Psych: Normal mood and affect        Assessment and Plan      Patient Active Problem List    Diagnosis Date Noted    Vaginal bleeding in pregnancy, third trimester 07/05/2022     Priority: Medium    Anemia affecting pregnancy in second trimester 06/29/2022     Priority: Medium     Overview Note:     5/11/22 Hgb 10.3   06/29/22 at [facility]: Anemia noted on recent lab studies. Physiologic anemia of pregnancy is usually mild. Start iron supplementation today. Rx given. ? Counseled to take iron with vitamin C, and to avoid taking with iron rich meals. ?   Take with stool softener, if not included in iron formulation. Check CBC in 4 weeks with TIBC and serum ferritin. ? If Hgb<9.5 or low iron stores, refer to hematology for consult and iron infusions. · Please draw CBC, reticulocyte count, TIBC, transferrin saturation, serum ferritin at next OB appt/prior to referral to hematology. -CC  If not performed previously, check hemoglobin electrophoresis.  Polyhydramnios in second trimester, antepartum complication, fetus 2      Priority: Medium    History of migraine during pregnancy 2022     Overview Note:     2022 at [facility]: Patient reports vomiting from Migraine on 22; Rx for Phenergan sent to patient's preferred pharmacy.  Cervical shortening affecting pregnancy in second trimester 2022    Cervical cerclage suture present in third trimester 2022     Overview Note:     2022 at [facility]: CL 1.57 cm; Patient sent to [facility] for rescue cerclage on PG  2022 at [facility]: CL 3.19 cm normal with minimal funneling. Still working, note given to reduce hours to 8/day and 32 hours weekly. 22 at [facility]: Cerclage removal scheduled for 22 at 10am. Pt given instructions  · Letter given to patient to reduce work hours. Assessment & Plan Note:     noted      Anxiety during pregnancy 2022     Overview Note:     3/23/2022 at [facility]: Patient states her   last May and her mother  this January. Feels anxious at times and tearful at today visit. 2022 at [facility]: Pt reports improved mood since starting Zoloft 50 mg, but still struggles with anxiety/stress, would like to increase dose. 2022 at [facility]: Pt currently taking Zoloft 100 mg. Stable, no SI/HI. 5/18/2022 at [facility]: Pt currently taking Zoloft 100mg. Stable, no SI/HI. · Prescription sent for Zoloft 100 mg daily, will probably need to increase dose to 150 mgs by 3rd trimester.  Dichorionic diamniotic twin pregnancy in third trimester 03/09/2022     Overview Note:     3/9/2022: Di-Di twins. 5/11/2022 at [facility]: Normal anatomy and echo x 2; limited cardiac views. CL 1.57 cm. Low risk NIPT. Baby A: AC 97%, DVP 6 cm; Baby B: AC 93%, DVP 7 cm.  06/09/22 at [facility]: Excessive fetal growth x2; Twin A: AC 85%, Overall 60% DVP 5.6 cm, Dopplers WNL. Twin B: Polyhydramnios. AC 84%, Overall 52%, DVP 9.3 cm, Dopplers WNL  06/29/22 at [facility]: Appropriate fetal growthx2; Twin B with Polyhydramnios. Twin A:  AC 55 %, DVP 5 cm, Dopplers WNL Twin B: Poly, AC 62%, DVP 10cm, Dopplers WNL    · Glucola scheduled for 7/12/22-will also do iron studies that day-CC  · F/U MFM 3 weeks Twin growth  · Letter given for pt to come out of work for remainder of pregnancy  · Will have Judi Katz reach out to pt due to coming out of work and doesn't have [phone] [address]    7/12/2022: Twin A BPP 8/8, JOSE nl, Twin B Polyhydramnios with MVP at 10 cm, BPP 8/8  CL 1.3 cm, no funneling, cerclage intact. Strict PTL precautions reviewed  Plan of care reviewed with Dr Santos Spine Note:     Glucola, cbc today  PTL/labor precautions, [address], and pregnancy warning signs reviewed. Pt advised to call the office at [phone] or go straight to [facility] and Delivery at [facility] with any of the following concerns vaginal bleeding, leaking of fluid, curtis regularly Q 5-7 minutes for over an hour or not feeling the baby move. RTO 2 weeks OBV with TDAP      Smoking (tobacco) complicating pregnancy, third trimester 03/09/2022     Overview Note:     3/9/2022: Pt down to 3 cigs/day (from 1 PPD). Encouraged continued cessation  3/23/2022 at [facility]: Patient reports smoking at most 3 cigs/day with vaping. Pt vapes constantly. Encouraged continue cessation  2022 at [facility]: Pt reports that she is down to 5 cigs per month, but continues to vapes daily. Provided [facility] from [facility] about the dangers of vaping today. 2022 at [facility]: Pt reports smoking 3-5 cigarettes per month, and no longer vapes. Patient counseled on smoking during pregnancy. 2022 at [facility]: Pt reports smoking has increased to 5 cigarettes a day. Patient counseled on smoking during pregnancy. 2022: Smoking 2-5 cigs a day + vaping. Cessation again encouraged    Encouraged patient to discontinue tobacco use as soon as possible. Discussed with patient risks in pregnancy including but not limited to placental abruption, oligohydramnios, intrauterine growth restriction and intrauterine death. Approximately 4 minutes spent in face to face counseling      AMA (advanced maternal age) multigravida 35+, second trimester 2022     Overview Note:     EDC by LMP confirmed by 11  week US    PLAN:  NIPT, MFM referral, Start ASA at 12-36 weeks, early/late glucola,  testing at 28 weeks  3/9/2020: NIPT neg x 3, CF/SMA negative  See Di/Di Twin Overview. Problem List Items Addressed This Visit        Genitourinary    Cervical cerclage suture present in third trimester     noted         Relevant Orders    AMB POC US FETAL BIOPHYSICAL PROFILE W/O NON STRESS TESTING (Completed)    AMB POC US OB TRANSVAGINAL (Completed)    AMB POC US FETAL BIOPHYSICAL PROFILE W/O NON STRESS TESTING (Completed)       Other    Anemia affecting pregnancy in second trimester    Relevant Orders    CBC    Ferritin    Folate    Reticulocytes    Transferrin Saturation    Vitamin B12    Glucose tolerance, 1 hour    Dichorionic diamniotic twin pregnancy in third trimester - Primary     Glucola, cbc today  PTL/labor precautions, [address], and pregnancy warning signs reviewed.  Pt advised to call the office at [phone] or go straight to Labor and Delivery at [facility] with any of the following concerns vaginal bleeding, leaking of fluid, curtis regularly Q 5-7 minutes for over an hour or not feeling the baby move.    RTO 2 weeks OBV with TDAP         Relevant Orders    AMB POC US FETAL BIOPHYSICAL PROFILE W/O NON STRESS TESTING (Completed)    AMB POC US OB TRANSVAGINAL (Completed)    AMB POC US FETAL BIOPHYSICAL PROFILE W/O NON STRESS TESTING (Completed)    Polyhydramnios in second trimester, antepartum complication, fetus 2    Smoking (tobacco) complicating pregnancy, third trimester      Other Visit Diagnoses     Polyhydramnios in third trimester complication, fetus 2 of multiple gestation        Relevant Orders    AMB POC US FETAL BIOPHYSICAL PROFILE W/O NON STRESS TESTING (Completed)    AMB POC US OB TRANSVAGINAL (Completed)    AMB POC US FETAL BIOPHYSICAL PROFILE W/O NON STRESS TESTING (Completed)    Excessive fetal growth affecting management of pregnancy in third trimester, fetus 2 of multiple gestation        Relevant Orders    AMB POC US FETAL BIOPHYSICAL PROFILE W/O NON STRESS TESTING (Completed)    AMB POC US OB TRANSVAGINAL (Completed)    AMB POC US FETAL BIOPHYSICAL PROFILE W/O NON STRESS TESTING (Completed)    AMA (advanced maternal age) multigravida 35+, third trimester        Relevant Orders    AMB POC US FETAL BIOPHYSICAL PROFILE W/O NON STRESS TESTING (Completed)    AMB POC US OB TRANSVAGINAL (Completed)    AMB POC US FETAL BIOPHYSICAL PROFILE W/O NON STRESS TESTING (Completed)    CBC    Ferritin    Folate    Reticulocytes    Transferrin Saturation    Vitamin B12    Glucose tolerance, 1 hour    Cervical shortening affecting pregnancy in third trimester        Relevant Orders    AMB POC US FETAL BIOPHYSICAL PROFILE W/O NON STRESS TESTING (Completed)    AMB POC US OB TRANSVAGINAL (Completed)    AMB POC US FETAL BIOPHYSICAL PROFILE W/O NON STRESS TESTING (Completed)          Orders Placed This Encounter   Procedures    AMB POC US FETAL

## 2022-07-12 NOTE — PROGRESS NOTES
Patient comes in today for routine prenatal visit. No complaints/concerns today.     Glucola draw- 11:11 am    Fetal Movements:  Yes  Contractions:  No  Vaginal Bleeding:  No  Leaking Fluid: No  GI/ issues: No    Vitals:    07/12/22 1005   BP: 126/68   Site: Left Upper Arm   Position: Sitting   Weight: 214 lb (97.1 kg)   Height: 5' 7\" (1.702 m)           Kenney Rob MA  07/12/22  10:11 AM

## 2022-07-13 ENCOUNTER — HOSPITAL ENCOUNTER (OUTPATIENT)
Age: 37
Discharge: HOME | End: 2022-07-13
Attending: OBSTETRICS & GYNECOLOGY | Admitting: OBSTETRICS & GYNECOLOGY
Payer: COMMERCIAL

## 2022-07-13 ENCOUNTER — TELEPHONE (OUTPATIENT)
Dept: OBGYN CLINIC | Age: 37
End: 2022-07-13

## 2022-07-13 VITALS
HEART RATE: 111 BPM | WEIGHT: 215 LBS | TEMPERATURE: 98.7 F | HEIGHT: 67 IN | BODY MASS INDEX: 33.74 KG/M2 | DIASTOLIC BLOOD PRESSURE: 64 MMHG | RESPIRATION RATE: 18 BRPM | SYSTOLIC BLOOD PRESSURE: 117 MMHG

## 2022-07-13 DIAGNOSIS — O40.2XX2 POLYHYDRAMNIOS IN SECOND TRIMESTER, ANTEPARTUM COMPLICATION, FETUS 2: ICD-10-CM

## 2022-07-13 LAB
ERYTHROCYTE [DISTWIDTH] IN BLOOD BY AUTOMATED COUNT: 13.8 % (ref 11.9–14.6)
FERRITIN SERPL-MCNC: 26 NG/ML (ref 8–388)
FOLATE SERPL-MCNC: 24.6 NG/ML (ref 3.1–17.5)
GLUCOSE 1 HOUR: 120 MG/DL
HCT VFR BLD AUTO: 33.3 % (ref 35.8–46.3)
HGB BLD-MCNC: 10.4 G/DL (ref 11.7–15.4)
HGB RETIC QN AUTO: 39 PG (ref 29–35)
IMM RETICS NFR: 25.8 % (ref 3–15.9)
IRON SATN MFR SERPL: 13 %
IRON SERPL-MCNC: 70 UG/DL (ref 35–150)
MCH RBC QN AUTO: 30.6 PG (ref 26.1–32.9)
MCHC RBC AUTO-ENTMCNC: 31.2 G/DL (ref 31.4–35)
MCV RBC AUTO: 97.9 FL (ref 79.6–97.8)
NRBC # BLD: 0 K/UL (ref 0–0.2)
PLATELET # BLD AUTO: 288 K/UL (ref 150–450)
PMV BLD AUTO: 9.7 FL (ref 9.4–12.3)
RBC # BLD AUTO: 3.4 M/UL (ref 4.05–5.2)
RETICS # AUTO: 0.12 M/UL (ref 0.03–0.1)
RETICS/RBC NFR AUTO: 3.5 % (ref 0.3–2)
TIBC SERPL-MCNC: 540 UG/DL (ref 250–450)
VIT B12 SERPL-MCNC: 499 PG/ML (ref 193–986)
WBC # BLD AUTO: 12.8 K/UL (ref 4.3–11.1)

## 2022-07-13 PROCEDURE — 6360000002 HC RX W HCPCS: Performed by: OBSTETRICS & GYNECOLOGY

## 2022-07-13 PROCEDURE — 76817 TRANSVAGINAL US OBSTETRIC: CPT

## 2022-07-13 PROCEDURE — 99283 EMERGENCY DEPT VISIT LOW MDM: CPT

## 2022-07-13 PROCEDURE — 59025 FETAL NON-STRESS TEST: CPT

## 2022-07-13 RX ORDER — ONDANSETRON 4 MG/1
4 TABLET, ORALLY DISINTEGRATING ORAL EVERY 8 HOURS PRN
Status: DISCONTINUED | OUTPATIENT
Start: 2022-07-13 | End: 2022-07-13 | Stop reason: HOSPADM

## 2022-07-13 RX ORDER — BETAMETHASONE SODIUM PHOSPHATE AND BETAMETHASONE ACETATE 3; 3 MG/ML; MG/ML
12 INJECTION, SUSPENSION INTRA-ARTICULAR; INTRALESIONAL; INTRAMUSCULAR; SOFT TISSUE EVERY 24 HOURS
Status: DISCONTINUED | OUTPATIENT
Start: 2022-07-13 | End: 2022-07-13 | Stop reason: HOSPADM

## 2022-07-13 RX ORDER — ACETAMINOPHEN 325 MG/1
650 TABLET ORAL EVERY 4 HOURS PRN
Status: DISCONTINUED | OUTPATIENT
Start: 2022-07-13 | End: 2022-07-13 | Stop reason: HOSPADM

## 2022-07-13 RX ORDER — ONDANSETRON 2 MG/ML
4 INJECTION INTRAMUSCULAR; INTRAVENOUS EVERY 6 HOURS PRN
Status: DISCONTINUED | OUTPATIENT
Start: 2022-07-13 | End: 2022-07-13 | Stop reason: HOSPADM

## 2022-07-13 RX ADMIN — BETAMETHASONE SODIUM PHOSPHATE AND BETAMETHASONE ACETATE 12 MG: 3; 3 INJECTION, SUSPENSION INTRA-ARTICULAR; INTRALESIONAL; INTRAMUSCULAR at 13:00

## 2022-07-13 NOTE — PROGRESS NOTES
OB ED       Admit to [facility]. EFM and TOCO applied. States off and on spotting since cerclage and increased vaginal pressure. Was told by [place] office to come to 4th floor. Abd palpated soft. Positive fetal movement noted.

## 2022-07-13 NOTE — PROGRESS NOTES
States she was sitting outside and began feeling lightheaded and SOB. Upon standing felt unstable. No states she feels \"not right\". Still having some chest tightness and SOB. EFM and toco in place.

## 2022-07-13 NOTE — H&P
History & Physical    Name: Krish Jenkins MRN: [id number]  SSN: [id number]    YOB: 1985  Age: 40 y.o. Sex: female        Subjective:   CC: VB and pelvic pressure/pain  HPI: 41 yo  at 29+2 wks presents with vb, pelvic pressure, and pelvic pain in a pregnancy c/b di/di twin gestation and cervical shortening. Rescue cerclage was placed 22. Pt had a TVUS for cervical length yesterday averaging 1.3cm. This was a decrease from 2.9cm on . She describes the vb as spotting with both \"old and new blood. \" She denies painful, regular ctxs. She reports normal FM. Her pregnancy has also been c/b AMA, polyhydramnios baby B, anemia, anxiety, and smoking. She has been seen multiple times for vaginal bleeding in pregnancy. Blood type is O pos. Estimated Date of Delivery: 22  OB History    Para Term  AB Living   2 1 1     1   SAB IAB Ectopic Molar Multiple Live Births             1      # Outcome Date GA Lbr Adriano/2nd Weight Sex Delivery Anes PTL Lv   2 Current            1 Term 12 40w2d  7 lb 2 oz (3.232 kg) F Vag-Spont  N MIKEL       Past Medical History:   Diagnosis Date    Anemia affecting pregnancy in second trimester 2022    Excessive fetal growth affecting management of pregnancy in second trimester, fetus 2 2022    Excessive fetal growth affecting pregnancy in second trimester, fetus 1 2022    History of deviated nasal septum 2022 at [facility]: Pt request Flonase- prescription sent to patient's  preferred pharmacy.       Hypothyroidism     in  was on synthroid was told it was stress induced     Polycystic disease, ovaries     In past (resolved per patient)    Vaginal bleeding during pregnancy 2022    Vaginal discharge during pregnancy 2022     Past Surgical History:   Procedure Laterality Date    CHOLECYSTECTOMY      IR CHOLECYSTOSTOMY PERCUTANEOUS COMPLETE      REVISION CERVIX [facility]           Social History     Occupational History    Not on file   Tobacco Use    Smoking status: Current Every Day Smoker     Packs/day: 0.25    Smokeless tobacco: Never Used    Tobacco comment: Quit smoking: Pt quit vaping   Vaping Use    Vaping Use: Former   Substance and Sexual Activity    Alcohol use: Not Currently    Drug use: Not Currently    Sexual activity: Not on file     Family History   Problem Relation Age of Onset    Diabetes Brother     Breast Cancer Neg Hx     Ovarian Cancer Neg Hx     Mult Sclerosis Paternal Grandmother     Cancer Maternal Grandmother     Diabetes Father     Stroke Mother     Diabetes Mother     Uterine Cancer Neg Hx     Colon Cancer Neg Hx        Allergies   Allergen Reactions    Latex Rash    Banana Itching    African [place] [Irvingia Gabonensis] Itching     Prior to Admission medications    Medication Sig Start Date End Date Taking?  Authorizing Provider   fluticasone (FLONASE) 50 MCG/ACT nasal spray SPRAY 2 SPRAYS INTO EACH NOSTRIL EVERY DAY 7/11/22   Vijaya Roman MD   Fe Fum-FA-B Dnt-A-En-Mg-Mn-Cu (FERROCITE PLUS) 106-1 MG TABS Take 1 tab daily as instructed 6/29/22   Thaddeus Hitchcock MD   docusate sodium (COLACE) 100 MG capsule Take 1 capsule by mouth 2 times daily as needed for Constipation 6/29/22   Thaddeus Hitchcock MD   Prenatal MV-Min-Fe Fum-FA-DHA (PRENATAL 1 PO) Take by mouth    Historical Provider, MD   famotidine (PEPCID) 20 MG tablet Take 1 tablet by mouth 2 times daily 6/9/22   Vijaya Roman MD   calcium carbonate (OS-MOE) 1250 (500 Ca) MG chewable tablet Take 1 tablet by mouth as needed    Historical Provider, MD   vitamin D 25 MCG (1000 UT) CAPS Take by mouth daily    Historical Provider, MD   sertraline (ZOLOFT) 100 MG tablet TAKE 1 TABLET BY MOUTH EVERY DAY 5/22/22   Historical Provider, MD   fexofenadine (ALLEGRA) 180 MG tablet Take by mouth    Historical Provider, MD   folic acid (FOLVITE) 860 MCG tablet Take 800 mcg by mouth daily    Historical Provider, MD   aspirin 81 MG EC tablet Take 81 mg by mouth daily    Historical Provider, MD   docusate sodium (COLACE) 50 MG capsule Take 50 mg by mouth 2 times daily 22  Historical Provider, MD   promethazine (PHENERGAN) 25 MG tablet Take 25 mg by mouth   Patient not taking: Reported on 2022   Historical Provider, MD        Review of Systems: Pertinent items are noted in HPI. 10 point ROS is also positive for general discomforts in pregnancy. Objective:     Vitals:  Vitals:    22 1201   BP: 117/64   Pulse: (!) 111   Resp: 18   Temp: 98.7 °F (37.1 °C)   TempSrc: Oral   Weight: 215 lb (97.5 kg)   Height: 5' 7\" (1.702 m)        Physical Exam:   Patient without distress  Abdomen: soft, nontender  Fundus: soft,:non tender  Perineum: blood absent, amniotic fluidabsent  Cervical Exam: SSE - NEFG, normal vaginal mucosa, minimal dark blood in vaginal fornix, no active bleeding, cervix visually closed. Lower Extremities:  - Edema 1+  TVUS: Cervix averaged 1.6cm; pics scanned to chart. TAUS: A vtx, B head maternal right with oblique lie  Cvx 80/cl/-2 on digital exam  Membranes:  Intact  Fetal Heart Rate: Baseline: A 120s, B 130s per minute  Variability: moderate x 2  Accelerations: yes x 2  Decelerations: none x 2  Uterine contractions: irritability, patient unaware    Prenatal Labs:   Lab Results   Component Value Date/Time    ABORH O POSITIVE 2022 07:23 AM         Assessment/Plan: 41 yo  at 29+2wks presented with c/o vaginal bleeding and pelvic pressure. Minimal vb noted on exam. Cervical length has shortened from 2.9cm to 1.3-1.6cm over the last week, but is unchanged from yesterday. I reviewed and interpreted both fetal tracings as category 1 for gestational age. Principal Problem:    Vaginal bleeding in pregnancy, third trimester  Resolved Problems:    * No resolved hospital problems.  *       Plan:   Decrease in cervical length from 22, but essentially unchanged from yesterday. Based on decrease in cervical length, steroids for [facility] were advised. Will give Celestone injection today and tomorrow. Keep OB follow up as scheduled. Continue pelvic rest. Care d/w Dr. Thomas Ching.

## 2022-07-13 NOTE — PROGRESS NOTES
Discharge instruction given. Information/teaching printout given to patient. States understanding. All questions answered. Informed pt to return to hospital for decreased fetal movement, if she suspects her water breaks, if vaginal bleeding occurs that is more than spotting, or she starts to experience painful regular contractions 4-5 minutes apart. Pt verbalizes understanding. Ambulate out to personal auto with daughter at side.

## 2022-07-13 NOTE — PROGRESS NOTES
MD at bedside. Vaginal ultrasound done. Pelvic exam done. Adbominal ultrasound done.  Cervical length measured and SVE c/80/-2

## 2022-07-14 ENCOUNTER — HOSPITAL ENCOUNTER (OUTPATIENT)
Age: 37
Discharge: HOME | End: 2022-07-14
Attending: OBSTETRICS & GYNECOLOGY | Admitting: OBSTETRICS & GYNECOLOGY
Payer: COMMERCIAL

## 2022-07-14 ENCOUNTER — APPOINTMENT (OUTPATIENT)
Dept: LABOR AND DELIVERY | Age: 37
End: 2022-07-14
Payer: COMMERCIAL

## 2022-07-14 PROCEDURE — 6360000002 HC RX W HCPCS: Performed by: OBSTETRICS & GYNECOLOGY

## 2022-07-14 PROCEDURE — 96372 THER/PROPH/DIAG INJ SC/IM: CPT

## 2022-07-14 RX ORDER — BETAMETHASONE SODIUM PHOSPHATE AND BETAMETHASONE ACETATE 3; 3 MG/ML; MG/ML
12 INJECTION, SUSPENSION INTRA-ARTICULAR; INTRALESIONAL; INTRAMUSCULAR; SOFT TISSUE ONCE
Status: COMPLETED | OUTPATIENT
Start: 2022-07-14 | End: 2022-07-14

## 2022-07-14 RX ADMIN — BETAMETHASONE SODIUM PHOSPHATE AND BETAMETHASONE ACETATE 12 MG: 3; 3 INJECTION, SUSPENSION INTRA-ARTICULAR; INTRALESIONAL; INTRAMUSCULAR at 13:29

## 2022-07-20 ENCOUNTER — ROUTINE PRENATAL (OUTPATIENT)
Dept: OBGYN CLINIC | Age: 37
End: 2022-07-20
Payer: COMMERCIAL

## 2022-07-20 VITALS — SYSTOLIC BLOOD PRESSURE: 112 MMHG | DIASTOLIC BLOOD PRESSURE: 70 MMHG

## 2022-07-20 DIAGNOSIS — O46.93 VAGINAL BLEEDING IN PREGNANCY, THIRD TRIMESTER: ICD-10-CM

## 2022-07-20 DIAGNOSIS — Z3A.30 30 WEEKS GESTATION OF PREGNANCY: Primary | ICD-10-CM

## 2022-07-20 DIAGNOSIS — O99.340 ANXIETY DURING PREGNANCY: ICD-10-CM

## 2022-07-20 DIAGNOSIS — O09.523 AMA (ADVANCED MATERNAL AGE) MULTIGRAVIDA 35+, THIRD TRIMESTER: ICD-10-CM

## 2022-07-20 DIAGNOSIS — O34.33 CERVICAL CERCLAGE SUTURE PRESENT IN THIRD TRIMESTER: ICD-10-CM

## 2022-07-20 DIAGNOSIS — F41.9 ANXIETY DURING PREGNANCY: ICD-10-CM

## 2022-07-20 DIAGNOSIS — O99.013 ANEMIA AFFECTING PREGNANCY IN THIRD TRIMESTER: ICD-10-CM

## 2022-07-20 DIAGNOSIS — O26.873 CERVICAL SHORTENING AFFECTING PREGNANCY IN THIRD TRIMESTER: ICD-10-CM

## 2022-07-20 DIAGNOSIS — O30.043 DICHORIONIC DIAMNIOTIC TWIN PREGNANCY IN THIRD TRIMESTER: ICD-10-CM

## 2022-07-20 DIAGNOSIS — O40.3XX2 POLYHYDRAMNIOS, THIRD TRIMESTER, FETUS 2: ICD-10-CM

## 2022-07-20 PROBLEM — Z86.69 HISTORY OF MIGRAINE DURING PREGNANCY: Status: RESOLVED | Noted: 2022-05-18 | Resolved: 2022-07-20

## 2022-07-20 PROBLEM — Z87.59 HISTORY OF MIGRAINE DURING PREGNANCY: Status: RESOLVED | Noted: 2022-05-18 | Resolved: 2022-07-20

## 2022-07-20 PROCEDURE — 76816 OB US FOLLOW-UP PER FETUS: CPT | Performed by: OBSTETRICS & GYNECOLOGY

## 2022-07-20 PROCEDURE — 76819 FETAL BIOPHYS PROFIL W/O NST: CPT | Performed by: OBSTETRICS & GYNECOLOGY

## 2022-07-20 PROCEDURE — 99214 OFFICE O/P EST MOD 30 MIN: CPT | Performed by: OBSTETRICS & GYNECOLOGY

## 2022-07-20 RX ORDER — SERTRALINE HYDROCHLORIDE 100 MG/1
TABLET, FILM COATED ORAL
Qty: 45 TABLET | Refills: 5 | Status: SHIPPED | OUTPATIENT
Start: 2022-07-20

## 2022-07-20 NOTE — PROGRESS NOTES
[facility] Consultation    Piter Root (: 1985) is a 40 y.o. Lorella Race at 30w2d with 2022, by Other Basis. Presents for evaluation of the following chief complaint(s):  High Risk Pregnancy (AMA, tobacco use, anxiety, Di/Di twins, cerclage) and Ultrasound (Growth/bpp)     Patient has been out of work since last [facility] appointment. Scheduled to see primary OB Edith Au) on 2022. Patient reports 2 HA's last week (sinus related) treated with Tylenol. Reports swelling in hands in feet after being active. Uses Pepcid for heartburn. Denies preeclamptic symptoms. Reports good fetal movement. No LOF or cramping. Reports Jared Crawley contractions several times per day. Report bright red blood when wiping last night but on recheck ten minutes later states no further bleeding. Mood reassuring    Interval history since prior appt reviewed and updated as indicated. Review of Systems - per HPI; otherwise unremarkable. Exam:     Vitals:    22 1101   BP: 112/70     Ultrasound: Please see formal ultrasound report under imaging tab. ASSESSMENT/PLAN:  Patient Active Problem List    Diagnosis Date Noted    Dichorionic diamniotic twin pregnancy in third trimester 2022     Priority: High     3/9/2022: Di-Di twins. 2022 at [facility]: Normal anatomy and echo x 2; limited cardiac views. CL 1.57 cm. Low risk NIPT. Baby A: AC 97%, DVP 6 cm; Baby B: AC 93%, DVP 7 cm.  22 at [facility]: Excessive fetal growth x2; Twin A: AC 85%, Overall 60% DVP 5.6 cm, Dopplers WNL. Twin B: Polyhydramnios. AC 84%, Overall 52%, DVP 9.3 cm, Dopplers WNL  22 at [facility]: Appropriate fetal growthx2; Twin B with Polyhydramnios. Twin A:  AC 55 %, DVP 5 cm, Dopplers WNL Twin B: Poly, AC 62%, DVP 10cm, Dopplers WNL  2022 at [facility]:  Appropriate fetal growth x2; Twin B with Polyhydramnios. Twin A:  AC 58 %, Overall 61%; DVP 6.3 cm, Dopplers WNL.  Twin B: Poly, AC 62%, Overall 54%, DVP 8.78 cm, Dopplers WNL    No follow-up with [facility]  Letter given for pt to come out of work for remainder of pregnancy  Will have Yetta Eaves reach out to pt due to coming out of work and doesn't have [id number] [address]    2022: Twin A BPP 8/8, JOSE nl, Twin B Polyhydramnios with MVP at 10 cm, BPP 8/8  CL 1.3 cm, no funneling, cerclage intact. Strict PTL precautions reviewed  Plan of care reviewed with Dr Judith Servin (advanced maternal age) multigravida 35+, third trimester 2022     Priority: High     EDC by LMP confirmed by 11  week US    PLAN:  NIPT, MFM referral, Start ASA at 12-36 weeks, early/late glucola,  testing at 28 weeks  3/9/2020: NIPT neg x 3, CF/SMA negative  See Di/Di Twin Overview. Vaginal bleeding in pregnancy, third trimester 2022     Priority: Medium    Anemia affecting pregnancy in third trimester 2022     Priority: Medium     22 Hgb 10.3   22 at [facility]: Anemia noted on recent lab studies. Physiologic anemia of pregnancy is usually mild. Start iron supplementation today. Rx given. Counseled to take iron with vitamin C, and to avoid taking with iron rich meals. Take with stool softener, if not included in iron formulation. Check CBC in 4 weeks with TIBC and serum ferritin. If Hgb<9.5 or low iron stores, refer to hematology for consult and iron infusions. Please draw CBC, reticulocyte count, TIBC, transferrin saturation, serum ferritin at next OB appt/prior to referral to hematology. -CC  If not performed previously, check hemoglobin electrophoresis. Polyhydramnios, third trimester, fetus 2 2022     Priority: Medium    Cervical shortening affecting pregnancy in third trimester 2022     Priority: Low    Anxiety during pregnancy 2022     Priority: Low     3/23/2022 at [facility]: Patient states her   last May and her mother  this January. Feels anxious at times and tearful at today visit.      2022 at [facility]: Pt reports improved mood since starting Zoloft 50 mg, but still struggles with anxiety/stress, would like to increase dose. 5/11/2022 at [facility]: Pt currently taking Zoloft 100 mg. Stable, no SI/HI. 5/18/2022 at [facility]: Pt currently taking Zoloft 100mg. Stable, no SI/HI.   7/20/2022 at [facility]: Pt reports anxiety is increasing and would like to increase dose to 150 mg.     · Prescription sent for Zoloft 100 mg daily, will probably need to increase dose to 150 mgs by 3rd trimester. Smoking (tobacco) complicating pregnancy, third trimester 03/09/2022     Priority: Low     3/9/2022: Pt down to 3 cigs/day (from 1 PPD). Encouraged continued cessation  3/23/2022 at [facility]: Patient reports smoking at most 3 cigs/day with vaping. Pt vapes constantly. Encouraged continue cessation  4/19/2022 at [facility]: Pt reports that she is down to 5 cigs per month, but continues to vapes daily. Provided literature from [facility] about the dangers of vaping today. 5/11/2022 at [facility]: Pt reports smoking 3-5 cigarettes per month, and no longer vapes. Patient counseled on smoking during pregnancy. 5/18/2022 at [facility]: Pt reports smoking has increased to 5 cigarettes a day. Patient counseled on smoking during pregnancy. 7/12/2022: Smoking 2-5 cigs a day + vaping. Cessation again encouraged    Encouraged patient to discontinue tobacco use as soon as possible. Discussed with patient risks in pregnancy including but not limited to placental abruption, oligohydramnios, intrauterine growth restriction and intrauterine death. Approximately 4 minutes spent in face to face counseling      Cervical cerclage suture present in third trimester 05/11/2022 5/11/2022 at [facility]: CL 1.57 cm; Patient sent to [facility] for rescue cerclage on PG  5/18/2022 at [facility]: CL 3.19 cm normal with minimal funneling. Still working, note given to reduce hours to 8/day and 32 hours weekly.   06/29/22 at [facility]: Cerclage removal scheduled for 9/5/22 at 10am. Pt given instructions  · Letter given to patient to reduce work hours. Would not do further CL. Growth in 3-4 weeks with OB. Recommend weekly BPP starting at 32 weeks in OB office. Addressed normal pregnancy complaints, reassured and offered suggestions for care  Reviewed gestational age precautions and activity goals/limitations  Nutritional counseling as well as specific goals based on current maternal and fetal status  Options for GERD therapy if becomes symptomatic over course of pregnancy  Gestational age appropriate preventive care regarding communicable disease transmission and vaccines as appropriate (including flu, TDaP, and COVID.)  Additional plans and concerns as documented in problem list.   All questions answered and concerns discussed. An electronic signature was used to authenticate this note. Juliano Lemons MD    I have spent 32 minutes reviewing previous notes, test results and face to face with the patient discussing the diagnosis and importance of compliance with the treatment plan, in addition to ultrasound findings, as well as documenting on the day of the visit (07/20/2022).     Return as needed for concerns      Patient Active Problem List   Diagnosis Code    Anxiety during pregnancy O99.340, F41.9    Dichorionic diamniotic twin pregnancy in third trimester O30.043    Smoking (tobacco) complicating pregnancy, third trimester O99.333    Cervical shortening affecting pregnancy in third trimester O26.873    Cervical cerclage suture present in third trimester O34.33    AMA (advanced maternal age) multigravida 35+, third trimester O09.523    Polyhydramnios, third trimester, fetus 2 O40.3XX2    Anemia affecting pregnancy in third trimester O99.013    Vaginal bleeding in pregnancy, third trimester O46.93

## 2022-07-21 PROBLEM — O46.93 VAGINAL BLEEDING IN PREGNANCY, THIRD TRIMESTER: Status: RESOLVED | Noted: 2022-07-05 | Resolved: 2022-07-21

## 2022-07-23 ENCOUNTER — HOSPITAL ENCOUNTER (OUTPATIENT)
Age: 37
Discharge: ANOTHER HOSPITAL | End: 2022-07-23
Attending: OBSTETRICS & GYNECOLOGY | Admitting: OBSTETRICS & GYNECOLOGY
Payer: COMMERCIAL

## 2022-07-23 VITALS
DIASTOLIC BLOOD PRESSURE: 80 MMHG | HEART RATE: 86 BPM | SYSTOLIC BLOOD PRESSURE: 113 MMHG | TEMPERATURE: 98.1 F | RESPIRATION RATE: 16 BRPM

## 2022-07-23 LAB
ALBUMIN SERPL-MCNC: 2.5 G/DL (ref 3.5–5)
ALBUMIN/GLOB SERPL: 0.6 {RATIO} (ref 1.2–3.5)
ALP SERPL-CCNC: 151 U/L (ref 50–130)
ALT SERPL-CCNC: 17 U/L (ref 12–65)
ANION GAP SERPL CALC-SCNC: 7 MMOL/L (ref 7–16)
AST SERPL-CCNC: 19 U/L (ref 15–37)
BASOPHILS # BLD: 0.1 K/UL (ref 0–0.2)
BASOPHILS NFR BLD: 0 % (ref 0–2)
BILIRUB SERPL-MCNC: 0.4 MG/DL (ref 0.2–1.1)
BUN SERPL-MCNC: 7 MG/DL (ref 6–23)
CALCIUM SERPL-MCNC: 9.1 MG/DL (ref 8.3–10.4)
CHLORIDE SERPL-SCNC: 107 MMOL/L (ref 98–107)
CO2 SERPL-SCNC: 24 MMOL/L (ref 21–32)
CREAT SERPL-MCNC: 0.45 MG/DL (ref 0.6–1)
DIFFERENTIAL METHOD BLD: ABNORMAL
EOSINOPHIL # BLD: 0.3 K/UL (ref 0–0.8)
EOSINOPHIL NFR BLD: 2 % (ref 0.5–7.8)
ERYTHROCYTE [DISTWIDTH] IN BLOOD BY AUTOMATED COUNT: 14.1 % (ref 11.9–14.6)
GLOBULIN SER CALC-MCNC: 4.1 G/DL (ref 2.3–3.5)
GLUCOSE SERPL-MCNC: 74 MG/DL (ref 65–100)
HCT VFR BLD AUTO: 32.9 % (ref 35.8–46.3)
HGB BLD-MCNC: 11.1 G/DL (ref 11.7–15.4)
IMM GRANULOCYTES # BLD AUTO: 0.1 K/UL (ref 0–0.5)
IMM GRANULOCYTES NFR BLD AUTO: 1 % (ref 0–5)
LYMPHOCYTES # BLD: 2 K/UL (ref 0.5–4.6)
LYMPHOCYTES NFR BLD: 15 % (ref 13–44)
MCH RBC QN AUTO: 31.4 PG (ref 26.1–32.9)
MCHC RBC AUTO-ENTMCNC: 33.7 G/DL (ref 31.4–35)
MCV RBC AUTO: 93.2 FL (ref 79.6–97.8)
MONOCYTES # BLD: 0.8 K/UL (ref 0.1–1.3)
MONOCYTES NFR BLD: 6 % (ref 4–12)
NEUTS SEG # BLD: 10.6 K/UL (ref 1.7–8.2)
NEUTS SEG NFR BLD: 76 % (ref 43–78)
NRBC # BLD: 0 K/UL (ref 0–0.2)
PLATELET # BLD AUTO: 303 K/UL (ref 150–450)
PMV BLD AUTO: 9.2 FL (ref 9.4–12.3)
POTASSIUM SERPL-SCNC: 4.3 MMOL/L (ref 3.5–5.1)
PROT SERPL-MCNC: 6.6 G/DL (ref 6.3–8.2)
RBC # BLD AUTO: 3.53 M/UL (ref 4.05–5.2)
SODIUM SERPL-SCNC: 138 MMOL/L (ref 136–145)
WBC # BLD AUTO: 13.9 K/UL (ref 4.3–11.1)

## 2022-07-23 PROCEDURE — 85025 COMPLETE CBC W/AUTO DIFF WBC: CPT

## 2022-07-23 PROCEDURE — 80053 COMPREHEN METABOLIC PANEL: CPT

## 2022-07-23 PROCEDURE — 99285 EMERGENCY DEPT VISIT HI MDM: CPT

## 2022-07-23 RX ORDER — MAGNESIUM SULFATE IN WATER 40 MG/ML
4000 INJECTION, SOLUTION INTRAVENOUS ONCE
Status: DISCONTINUED | OUTPATIENT
Start: 2022-07-23 | End: 2022-07-23 | Stop reason: HOSPADM

## 2022-07-23 RX ORDER — MAGNESIUM SULFATE IN WATER 40 MG/ML
2000 INJECTION, SOLUTION INTRAVENOUS ONCE
Status: DISCONTINUED | OUTPATIENT
Start: 2022-07-23 | End: 2022-07-23 | Stop reason: HOSPADM

## 2022-07-23 NOTE — DISCHARGE SUMMARY
See H and P  After discussion with MFM, pt readied for transfer to [place] secondary to gestational age of 30.5 weeks with PTL v incompetent cervix.   Promptly transferred from [facility] Triage to [place] ([facility])

## 2022-07-23 NOTE — H&P
40year old  at 35.9 weeks with di-di twins  Presented with complaints of mild bleeding (for which she has had multiple prior presentations) and \"Jared-Crawley\" with occasional painful contractions    S/p prior term delivery  P Surg Hx - rianna  P Med Hx - nil  Allergies - Latex, bananas, mangos  Meds - see list  Social  smoker    Appears comfortable    Both twins with reassuring NSTS; Cat I  Mild irregular rare contractions    Lungs CTAB  Cor RRR  Abd soft  DTRs brisk and symmetric  Nl EG  Very modest dark bloody discharge on perineum  Spec; modest dark discharge  Membranes visible at cervix  No suspicion of PROM  Digital; cerclage seems intact, but external os 3-4 cm; cervix still fairly thick and posterior    PTL v incompetent cervix. Di-Di twins at 30.5 weeks  Discussed with covering private OB ([address]) and MFM (Mariano Lowery)    Will begin magnesium sulfate infusion and prophylactic abx (Ampicillin)  Note; pt has rec'd celestone within the last two weeks    Arranging transfer to [facility].   Discussed with Dr. Annie Laboy, VAIBHAV who accepts transfer  Reviewed POC with patient

## 2022-07-27 ENCOUNTER — FOLLOWUP TELEPHONE ENCOUNTER (OUTPATIENT)
Dept: CASE MANAGEMENT | Age: 37
End: 2022-07-27

## 2022-07-27 NOTE — TELEPHONE ENCOUNTER
Phone call to patient at [phone] to check-in. Patient states that her twin boys are currently in the NICU at [facility]. Patient was discharged from [facility] on 7/26/22. Discussed how babies are doing as well as how patient is coping at this time. Emotional support provided by .      RAJEEV Bajwa, [address] [facility]   [phone]

## 2022-08-05 ENCOUNTER — POSTPARTUM VISIT (OUTPATIENT)
Dept: OBGYN CLINIC | Age: 37
End: 2022-08-05

## 2022-08-05 VITALS
HEIGHT: 67 IN | WEIGHT: 195.2 LBS | BODY MASS INDEX: 30.64 KG/M2 | DIASTOLIC BLOOD PRESSURE: 68 MMHG | SYSTOLIC BLOOD PRESSURE: 114 MMHG

## 2022-08-05 DIAGNOSIS — Z09 POSTOPERATIVE EXAMINATION: ICD-10-CM

## 2022-08-05 PROCEDURE — 99024 POSTOP FOLLOW-UP VISIT: CPT | Performed by: OBSTETRICS & GYNECOLOGY

## 2022-08-05 NOTE — PROGRESS NOTES
Patient comes in today for 2 week post op visit.      Delivery Method:     Delivery Date: 2022    Birth Control: none    Breast/Bottle: breast    Bleeding: yes     Baby: Doing well    Bowel/Bladder: No issues    Blues: None    Last pap smear:  3/9/2022 negative, hpv negative     Vitals:    22 0936   BP: 114/68   Site: Left Upper Arm   Position: Sitting   Weight: 195 lb 3.2 oz (88.5 kg)   Height: 5' 7\" (1.702 m)       Roberto Wang MA  9:41 AM  22

## 2022-08-05 NOTE — PROGRESS NOTES
[address], [place] [address], [address] [facility]    Edna Dolan MD, [place], [facility]  Bertrand Erwin MD, FACOG      Ms. Liane Maki presents today for 2 week incision check from . She is without complaints today. Breast feeding without issues. Bleeding wnl, no F/C, CP, SOB. No issues with incision. Sleeping well. Baby doing well.       Vitals:    22 0936   BP: 114/68   Site: Left Upper Arm   Position: Sitting   Weight: 195 lb 3.2 oz (88.5 kg)   Height: 5' 7\" (1.702 m)          CV - RRR    Lungs - CTA bilat    ABD - soft, approp tend, incision C/D/I, no s/sx of infection    EXT - tr edema bilat      Problem List Items Addressed This Visit          Other    Postoperative examination     Healing well - routine PO instructions               Edna Dolan MD   9:48 AM  22

## 2022-08-05 NOTE — PATIENT INSTRUCTIONS
Keep incision clean and dry - use a hair dryer on cool setting. Please let us know if you develop fever over 101.0, severe abdominal pain, persistent nausea or vomiting, or your incision gets red, hot, swollen or has a foul discharge. Continue pelvic rest until your next visit. Thanks for coming to see us today and letting us take care of you!

## 2022-09-02 ENCOUNTER — POSTPARTUM VISIT (OUTPATIENT)
Dept: OBGYN CLINIC | Age: 37
End: 2022-09-02

## 2022-09-02 VITALS
WEIGHT: 198 LBS | BODY MASS INDEX: 31.08 KG/M2 | DIASTOLIC BLOOD PRESSURE: 70 MMHG | HEIGHT: 67 IN | SYSTOLIC BLOOD PRESSURE: 122 MMHG

## 2022-09-02 PROCEDURE — 99902 PR PRENATAL VISIT: CPT | Performed by: OBSTETRICS & GYNECOLOGY

## 2022-09-02 RX ORDER — IBUPROFEN 600 MG/1
TABLET ORAL
COMMUNITY
Start: 2022-07-26

## 2022-09-02 ASSESSMENT — PATIENT HEALTH QUESTIONNAIRE - PHQ9
SUM OF ALL RESPONSES TO PHQ QUESTIONS 1-9: 0
1. LITTLE INTEREST OR PLEASURE IN DOING THINGS: 0
SUM OF ALL RESPONSES TO PHQ9 QUESTIONS 1 & 2: 0
SUM OF ALL RESPONSES TO PHQ QUESTIONS 1-9: 0
2. FEELING DOWN, DEPRESSED OR HOPELESS: 0

## 2022-09-02 NOTE — PROGRESS NOTES
Patient comes in today for 6 week post partum visit.      Delivery Method:     Delivery Date: 2022    Birth Control: IUD    Breast/Bottle: Breast    Bleeding: None    Baby: Doing well    Bowel/Bladder: No issues    Blues: None    Last pap smear:  2022, Negative HPV Negative    Vitals:    22 0941   BP: 122/70   Site: Left Upper Arm   Position: Sitting   Weight: 198 lb (89.8 kg)   Height: 5' 7\" (1.702 m)         Yaquelin Sanders MA  9:48 AM  22

## 2022-09-02 NOTE — ASSESSMENT & PLAN NOTE
Exam as above  Encouraged annual exams with paps as indicated  Pt to F/U with PCP for all non-gyn health related issues

## 2022-09-02 NOTE — PROGRESS NOTES
[facility]  [address], [place] [address], [address]  [facility], MD, Abimael Fitzgerald LIZZETTE-BC  Alexander Barrios MD, FACOG    6 week Postpartum Office Visit    King Jd is a 40 y.o. [id number] that presents for PP visit today    Birth Control: IUD    Breast/Bottle: breast    Bleeding: none    Baby: Doing well    Bowel/Bladder: No issues    Blues: None    Last pap:  2022    OB History    Para Term  AB Living   2 2 1 1   3   SAB IAB Ectopic Molar Multiple Live Births           1 3      # Outcome Date GA Lbr Adriano/2nd Weight Sex Delivery Anes PTL Lv   2A  22 30w5d   M CS-LTranv Spinal Y MIKEL   2B  22 30w5d  3 lb 4.9 oz (1.5 kg) M CS-LTranv Spinal Y MIKEL   1 Term 12 40w2d  7 lb 2 oz (3.232 kg) F Vag-Spont  N MIKEL        Past Medical History:   Diagnosis Date    Anemia affecting pregnancy in second trimester 2022    Excessive fetal growth affecting management of pregnancy in second trimester, fetus 2 2022    Excessive fetal growth affecting pregnancy in second trimester, fetus 1 2022    History of deviated nasal septum 2022 at [facility]: Pt request Flonase- prescription sent to patient's  preferred pharmacy. Hypothyroidism     in  was on synthroid was told it was stress induced     Polycystic disease, ovaries     In past (resolved per patient)    Vaginal bleeding during pregnancy 2022    Vaginal discharge during pregnancy 2022       Past Surgical History:   Procedure Laterality Date    CHOLECYSTECTOMY      IR CHOLECYSTOSTOMY PERCUTANEOUS COMPLETE      REVISION CERVIX [facility]              Social History     Socioeconomic History    Marital status:       Spouse name: Not on file    Number of children: Not on file    Years of education: Not on file    Highest education level: Not on file   Occupational History    Not on file   Tobacco Use    Smoking status: Former Packs/day: 0.25     Types: Cigarettes    Smokeless tobacco: Never    Tobacco comments:     Vape   Vaping Use    Vaping Use: Every day   Substance and Sexual Activity    Alcohol use: Not Currently    Drug use: Not Currently    Sexual activity: Not on file   Other Topics Concern    Not on file   Social History Narrative    Abuse: Feels safe at home, no history of physical abuse, no history of sexual abuse      Social Determinants of Health     Financial Resource Strain: Not on file   Food Insecurity: Not on file   Transportation Needs: Not on file   Physical Activity: Not on file   Stress: Not on file   Social Connections: Not on file   Intimate Partner Violence: Not on file   Housing Stability: Not on file       Vitals:    09/02/22 0941   BP: 122/70   Site: Left Upper Arm   Position: Sitting   Weight: 198 lb (89.8 kg)   Height: 5' 7\" (1.702 m)        Review of Systems    Constitutional:  No fevers or chills    CV: No chest pain or palpitations    Resp: No SOB or cough    GI:  No nausea/vomiting/diarrhea/constipation    Neuro: No HA, no seizure like activity    Skin: No rashes or lesions    Breast: No breast pain, masses, bleeding    : No dysuria or hematuria    Gyn:  No menstrual, pelvic issues      Physical Exam    General:  well developed, well nourished, in no acute distress     Head:   normocephalic and atraumatic     Mouth:  no deformity or lesions with good dentition     Neck:  no masses, thyromegaly, or abnormal cervical nodes     Lungs:  clear bilaterally with normal respirations     Heart:   regular rate and rhythm, S1, S2 without murmurs     Abdomen:  bowel sounds positive; abdomen soft and non-tender without masses, organomegaly, or hernias noted     Pelvic Exam:       External: normal female genitalia without lesions or masses       Vagina: normal without lesions or masses       Cervix: normal without lesions or masses       Adnexa: normal bimanual exam without masses or fullness       Uterus: normal by palpation       Pap smear: not performed     Msk:   no deformity or scoliosis noted with normal posture and gait     Extremities: no clubbing, cyanosis, edema, or deformity noted with normal full range of motion of all joints     Neurologic:  no focal deficits,normal coordination, muscle strength and tone     Skin:   intact without lesions or rashes     Psych:  alert and cooperative; normal mood and affect; normal attention span and concentration    Patient Active Problem List    Diagnosis Date Noted    Postpartum exam 09/02/2022     Priority: Medium     Overview Note:     Primary C/S (PTL, twins at 32 4/7 weeks) on 7/23/22       Assessment & Plan Note:     Exam as above  Encouraged annual exams with paps as indicated  Pt to F/U with PCP for all non-gyn health related issues         Problem List Items Addressed This Visit          Other    Postpartum exam     Exam as above  Encouraged annual exams with paps as indicated  Pt to F/U with PCP for all non-gyn health related issues              Jonathon Pantoja MD   9:54 AM  09/02/22

## 2022-09-17 DIAGNOSIS — O30.042 DICHORIONIC DIAMNIOTIC TWIN PREGNANCY IN SECOND TRIMESTER: ICD-10-CM

## 2022-09-19 RX ORDER — FAMOTIDINE 20 MG/1
TABLET, FILM COATED ORAL
Qty: 60 TABLET | Refills: 3 | OUTPATIENT
Start: 2022-09-19

## 2022-09-19 RX ORDER — FLUTICASONE PROPIONATE 50 MCG
SPRAY, SUSPENSION (ML) NASAL
Refills: 1 | OUTPATIENT
Start: 2022-09-19

## 2023-09-07 ENCOUNTER — OFFICE VISIT (OUTPATIENT)
Dept: OBGYN CLINIC | Age: 38
End: 2023-09-07

## 2023-09-07 VITALS
BODY MASS INDEX: 35.94 KG/M2 | HEIGHT: 67 IN | DIASTOLIC BLOOD PRESSURE: 70 MMHG | SYSTOLIC BLOOD PRESSURE: 124 MMHG | WEIGHT: 229 LBS

## 2023-09-07 DIAGNOSIS — Z01.419 WOMEN'S ANNUAL ROUTINE GYNECOLOGICAL EXAMINATION: Primary | ICD-10-CM

## 2023-09-07 DIAGNOSIS — Z72.0 VAPES NICOTINE CONTAINING SUBSTANCE: ICD-10-CM

## 2023-09-07 ASSESSMENT — PATIENT HEALTH QUESTIONNAIRE - PHQ9
2. FEELING DOWN, DEPRESSED OR HOPELESS: 1
SUM OF ALL RESPONSES TO PHQ QUESTIONS 1-9: 2
SUM OF ALL RESPONSES TO PHQ9 QUESTIONS 1 & 2: 2
1. LITTLE INTEREST OR PLEASURE IN DOING THINGS: 1
SUM OF ALL RESPONSES TO PHQ QUESTIONS 1-9: 2

## 2023-09-07 NOTE — PROGRESS NOTES
Pt comes in today for AE. LAST PAP:  03/09/2022 negative negative     LAST MAMMO:  never    LMP:  Patient's last menstrual period was 08/27/2023.     BIRTH CONTROL:  IUD    TOBACCO USE:  Yes vape     FAMILY HISTORY OF:   Breast Cancer:  No   Ovarian Cancer:  No   Uterine Cancer:  No   Colon Cancer:  No    Vitals:    09/07/23 1028   BP: 124/70   Site: Left Upper Arm   Position: Sitting   Weight: 229 lb (103.9 kg)   Height: 5' 7\" (1.702 m)        Ramiro Jarvis MA  09/07/23  10:35 AM

## 2023-09-07 NOTE — PROGRESS NOTES
[facility]  Jarrett Ordonez [facility], [address] [place] [address]  [phone]    Lety Ferrari MD, Georgie Zavala, [facility]  Jose De Jesus Whitmore MD, FACOG      Assessment/Plan     Patient Active Problem List    Diagnosis Date Noted    Women's annual routine gynecological examination 09/07/2023     Overview Note:     Last pap 3/9/22 - neg, neg HPV         Assessment & Plan Note:     Exam as below  Encouraged annual exams with paps as indicated  Pt to F/U with PCP for all non-gyn health related issues       Vapes nicotine containing substance 09/07/2023     Overview Note:     noted         Assessment & Plan Note:     D/W pt at length neg effects of tobacco abuse including but not limited to: death, multiple forms of CA, COPD, CAD, vascular damage, etc.  Encouraged cessation and D/W her methods (tapering, counseling, nicotine patches/gums, etc.)  Approximately 4 minutes spent in face to face counseling           Problem List Items Addressed This Visit       Women's annual routine gynecological examination - Primary     Exam as below  Encouraged annual exams with paps as indicated  Pt to F/U with PCP for all non-gyn health related issues          Vapes nicotine containing substance     D/W pt at length neg effects of tobacco abuse including but not limited to: death, multiple forms of CA, COPD, CAD, vascular damage, etc.  Encouraged cessation and D/W her methods (tapering, counseling, nicotine patches/gums, etc.)  Approximately 4 minutes spent in face to face counseling                Subjective     LAST PAP:  03/09/2022 negative negative      LAST MAMMO:  never     LMP:  Patient's last menstrual period was 08/27/2023. BIRTH CONTROL:  IUD     TOBACCO USE:  Yes alma delia Poe 45 y.o. [id number] presents today for annual Gyn exam. No issues or complaints today. Denies any vaginal D/C, pelvic pain, non-menstrual pelvic pain, F/C, assoc GI/ issues. NO breast issues.  Denies any neuro changes, visual

## 2023-09-07 NOTE — PATIENT INSTRUCTIONS
STOP SMOKING! Follow up as needed or next year for your yearly female exam.  Thanks for coming to see us today and letting us take care of you!

## 2023-10-07 PROBLEM — Z01.419 WOMEN'S ANNUAL ROUTINE GYNECOLOGICAL EXAMINATION: Status: RESOLVED | Noted: 2023-09-07 | Resolved: 2023-10-07

## 2024-01-02 ENCOUNTER — APPOINTMENT (OUTPATIENT)
Dept: CT IMAGING | Age: 39
End: 2024-01-02
Payer: COMMERCIAL

## 2024-01-02 ENCOUNTER — HOSPITAL ENCOUNTER (EMERGENCY)
Age: 39
Discharge: HOME | End: 2024-01-02
Payer: COMMERCIAL

## 2024-01-02 VITALS
HEART RATE: 72 BPM | TEMPERATURE: 97.1 F | OXYGEN SATURATION: 99 % | DIASTOLIC BLOOD PRESSURE: 72 MMHG | RESPIRATION RATE: 18 BRPM | SYSTOLIC BLOOD PRESSURE: 110 MMHG

## 2024-01-02 DIAGNOSIS — H70.92 MASTOIDITIS OF LEFT SIDE: ICD-10-CM

## 2024-01-02 DIAGNOSIS — H81.10 BENIGN PAROXYSMAL POSITIONAL VERTIGO, UNSPECIFIED LATERALITY: Primary | ICD-10-CM

## 2024-01-02 LAB
ALBUMIN SERPL-MCNC: 3.9 G/DL (ref 3.5–5)
ALBUMIN/GLOB SERPL: 1 (ref 0.4–1.6)
ALP SERPL-CCNC: 76 U/L (ref 50–136)
ALT SERPL-CCNC: 20 U/L (ref 12–65)
AMPHET UR QL SCN: NEGATIVE
ANION GAP SERPL CALC-SCNC: 1 MMOL/L (ref 2–11)
APPEARANCE UR: ABNORMAL
AST SERPL-CCNC: 9 U/L (ref 15–37)
BACTERIA URNS QL MICRO: ABNORMAL /HPF
BARBITURATES UR QL SCN: NEGATIVE
BASOPHILS # BLD: 0.1 K/UL (ref 0–0.2)
BASOPHILS NFR BLD: 0 % (ref 0–2)
BENZODIAZ UR QL: NEGATIVE
BILIRUB SERPL-MCNC: 0.3 MG/DL (ref 0.2–1.1)
BILIRUB UR QL: NEGATIVE
BUN SERPL-MCNC: 15 MG/DL (ref 6–23)
CALCIUM SERPL-MCNC: 9.2 MG/DL (ref 8.3–10.4)
CANNABINOIDS UR QL SCN: NEGATIVE
CHLORIDE SERPL-SCNC: 110 MMOL/L (ref 103–113)
CO2 SERPL-SCNC: 27 MMOL/L (ref 21–32)
COCAINE UR QL SCN: NEGATIVE
COLOR UR: ABNORMAL
CREAT SERPL-MCNC: 0.86 MG/DL (ref 0.6–1)
DIFFERENTIAL METHOD BLD: ABNORMAL
EOSINOPHIL # BLD: 0.1 K/UL (ref 0–0.8)
EOSINOPHIL NFR BLD: 1 % (ref 0.5–7.8)
EPI CELLS #/AREA URNS HPF: ABNORMAL /HPF
ERYTHROCYTE [DISTWIDTH] IN BLOOD BY AUTOMATED COUNT: 13.2 % (ref 11.9–14.6)
FLUAV RNA SPEC QL NAA+PROBE: NOT DETECTED
FLUBV RNA SPEC QL NAA+PROBE: NOT DETECTED
GLOBULIN SER CALC-MCNC: 3.8 G/DL (ref 2.8–4.5)
GLUCOSE SERPL-MCNC: 140 MG/DL (ref 65–100)
GLUCOSE UR STRIP.AUTO-MCNC: NEGATIVE MG/DL
HCG UR QL: NEGATIVE
HCT VFR BLD AUTO: 41.8 % (ref 35.8–46.3)
HGB BLD-MCNC: 13.6 G/DL (ref 11.7–15.4)
HGB UR QL STRIP: NEGATIVE
IMM GRANULOCYTES # BLD AUTO: 0.1 K/UL (ref 0–0.5)
IMM GRANULOCYTES NFR BLD AUTO: 0 % (ref 0–5)
KETONES UR QL STRIP.AUTO: 40 MG/DL
LEUKOCYTE ESTERASE UR QL STRIP.AUTO: ABNORMAL
LIPASE SERPL-CCNC: 74 U/L (ref 73–393)
LYMPHOCYTES # BLD: 1.2 K/UL (ref 0.5–4.6)
LYMPHOCYTES NFR BLD: 7 % (ref 13–44)
MAGNESIUM SERPL-MCNC: 2.5 MG/DL (ref 1.8–2.4)
MCH RBC QN AUTO: 29.6 PG (ref 26.1–32.9)
MCHC RBC AUTO-ENTMCNC: 32.5 G/DL (ref 31.4–35)
MCV RBC AUTO: 91.1 FL (ref 82–102)
METHADONE UR QL: NEGATIVE
MONOCYTES # BLD: 0.6 K/UL (ref 0.1–1.3)
MONOCYTES NFR BLD: 3 % (ref 4–12)
NEUTS SEG # BLD: 14.3 K/UL (ref 1.7–8.2)
NEUTS SEG NFR BLD: 88 % (ref 43–78)
NITRITE UR QL STRIP.AUTO: NEGATIVE
NRBC # BLD: 0 K/UL (ref 0–0.2)
OPIATES UR QL: NEGATIVE
PCP UR QL: NEGATIVE
PH UR STRIP: 7 (ref 5–9)
PLATELET # BLD AUTO: 310 K/UL (ref 150–450)
PMV BLD AUTO: 9 FL (ref 9.4–12.3)
POTASSIUM SERPL-SCNC: 4 MMOL/L (ref 3.5–5.1)
PROT SERPL-MCNC: 7.7 G/DL (ref 6.3–8.2)
PROT UR STRIP-MCNC: ABNORMAL MG/DL
RBC # BLD AUTO: 4.59 M/UL (ref 4.05–5.2)
RBC #/AREA URNS HPF: ABNORMAL /HPF
SARS-COV-2 RDRP RESP QL NAA+PROBE: NOT DETECTED
SODIUM SERPL-SCNC: 138 MMOL/L (ref 136–146)
SOURCE: NORMAL
SP GR UR REFRACTOMETRY: 1.03 (ref 1–1.02)
UROBILINOGEN UR QL STRIP.AUTO: 1 EU/DL (ref 0.2–1)
WBC # BLD AUTO: 16.2 K/UL (ref 4.3–11.1)
WBC URNS QL MICRO: ABNORMAL /HPF

## 2024-01-02 PROCEDURE — 80307 DRUG TEST PRSMV CHEM ANLYZR: CPT

## 2024-01-02 PROCEDURE — 81025 URINE PREGNANCY TEST: CPT

## 2024-01-02 PROCEDURE — 6370000000 HC RX 637 (ALT 250 FOR IP): Performed by: PHYSICIAN ASSISTANT

## 2024-01-02 PROCEDURE — 99284 EMERGENCY DEPT VISIT MOD MDM: CPT

## 2024-01-02 PROCEDURE — 83735 ASSAY OF MAGNESIUM: CPT

## 2024-01-02 PROCEDURE — 81001 URINALYSIS AUTO W/SCOPE: CPT

## 2024-01-02 PROCEDURE — 85025 COMPLETE CBC W/AUTO DIFF WBC: CPT

## 2024-01-02 PROCEDURE — 96375 TX/PRO/DX INJ NEW DRUG ADDON: CPT

## 2024-01-02 PROCEDURE — 80053 COMPREHEN METABOLIC PANEL: CPT

## 2024-01-02 PROCEDURE — 70450 CT HEAD/BRAIN W/O DYE: CPT

## 2024-01-02 PROCEDURE — 87502 INFLUENZA DNA AMP PROBE: CPT

## 2024-01-02 PROCEDURE — 6360000002 HC RX W HCPCS: Performed by: PHYSICIAN ASSISTANT

## 2024-01-02 PROCEDURE — 96365 THER/PROPH/DIAG IV INF INIT: CPT

## 2024-01-02 PROCEDURE — 96361 HYDRATE IV INFUSION ADD-ON: CPT

## 2024-01-02 PROCEDURE — 87635 SARS-COV-2 COVID-19 AMP PRB: CPT

## 2024-01-02 PROCEDURE — 2580000003 HC RX 258: Performed by: PHYSICIAN ASSISTANT

## 2024-01-02 PROCEDURE — 83690 ASSAY OF LIPASE: CPT

## 2024-01-02 RX ORDER — FLUCONAZOLE 150 MG/1
150 TABLET ORAL ONCE
Qty: 1 TABLET | Refills: 0 | Status: SHIPPED | OUTPATIENT
Start: 2024-01-02 | End: 2024-01-02

## 2024-01-02 RX ORDER — METHYLPREDNISOLONE 4 MG/1
TABLET ORAL
Qty: 21 TABLET | Refills: 0 | Status: SHIPPED | OUTPATIENT
Start: 2024-01-02 | End: 2024-01-08

## 2024-01-02 RX ORDER — MECLIZINE HYDROCHLORIDE 25 MG/1
25 TABLET ORAL 3 TIMES DAILY PRN
Qty: 21 TABLET | Refills: 0 | Status: SHIPPED | OUTPATIENT
Start: 2024-01-02 | End: 2024-01-09

## 2024-01-02 RX ORDER — AMOXICILLIN AND CLAVULANATE POTASSIUM 875; 125 MG/1; MG/1
1 TABLET, FILM COATED ORAL 2 TIMES DAILY
Qty: 14 TABLET | Refills: 0 | Status: SHIPPED | OUTPATIENT
Start: 2024-01-02 | End: 2024-01-09

## 2024-01-02 RX ORDER — 0.9 % SODIUM CHLORIDE 0.9 %
1000 INTRAVENOUS SOLUTION INTRAVENOUS
Status: COMPLETED | OUTPATIENT
Start: 2024-01-02 | End: 2024-01-02

## 2024-01-02 RX ORDER — ONDANSETRON 2 MG/ML
4 INJECTION INTRAMUSCULAR; INTRAVENOUS
Status: COMPLETED | OUTPATIENT
Start: 2024-01-02 | End: 2024-01-02

## 2024-01-02 RX ORDER — MECLIZINE HYDROCHLORIDE 25 MG/1
25 TABLET ORAL
Status: COMPLETED | OUTPATIENT
Start: 2024-01-02 | End: 2024-01-02

## 2024-01-02 RX ADMIN — PIPERACILLIN AND TAZOBACTAM 4500 MG: 4; .5 INJECTION, POWDER, FOR SOLUTION INTRAVENOUS at 16:26

## 2024-01-02 RX ADMIN — ONDANSETRON 4 MG: 2 INJECTION INTRAMUSCULAR; INTRAVENOUS at 12:23

## 2024-01-02 RX ADMIN — SODIUM CHLORIDE 1000 ML: 9 INJECTION, SOLUTION INTRAVENOUS at 12:22

## 2024-01-02 RX ADMIN — MECLIZINE HYDROCHLORIDE 25 MG: 25 TABLET ORAL at 13:16

## 2024-01-02 ASSESSMENT — PAIN SCALES - GENERAL
PAINLEVEL_OUTOF10: 0
PAINLEVEL_OUTOF10: 0

## 2024-01-02 ASSESSMENT — PAIN - FUNCTIONAL ASSESSMENT: PAIN_FUNCTIONAL_ASSESSMENT: 0-10

## 2024-01-02 NOTE — ED NOTES
I have reviewed discharge instructions with the patient and parent.  The patient and parent verbalized understanding.    Patient left ED via Discharge Method: ambulatory to Home with dad.    Opportunity for questions and clarification provided.       Patient given 4 scripts.   Sent to pharm      To continue your aftercare when you leave the hospital, you may receive an automated call from our care team to check in on how you are doing.  This is a free service and part of our promise to provide the best care and service to meet your aftercare needs.” If you have questions, or wish to unsubscribe from this service please call [phone].  Thank you for Choosing our [facility] Emergency Department.

## 2024-01-02 NOTE — ED TRIAGE NOTES
Pt reports nasal congestion drainage, chills starting yesterday today pt reports, experienced vertigo with nausea/vomiting. Sx persisting so here for evaluation

## 2024-01-03 NOTE — ED PROVIDER NOTES
Smokeless tobacco: Never    Tobacco comments:     Vape   Vaping Use    Vaping Use: Every day   Substance and Sexual Activity    Alcohol use: Not Currently    Drug use: Not Currently    Sexual activity: Yes     Partners: Male     Birth control/protection: None   Social History Narrative    Abuse: Feels safe at home, no history of physical abuse, no history of sexual abuse         Discharge Medication List as of 1/2/2024  5:04 PM        CONTINUE these medications which have NOT CHANGED    Details   IUD'S IU by IntraUTERine routeHistorical Med      fexofenadine (ALLEGRA) 180 MG tablet Take by mouthHistorical Med              Results for orders placed or performed during the hospital encounter of 01/02/24   Influenza A/B, Molecular    Specimen: Nasopharyngeal   Result Value Ref Range    Influenza A, DWAIN Not detected NOTD      Influenza B, DWAIN Not detected NOTD     COVID-19, Rapid    Specimen: Nasopharyngeal   Result Value Ref Range    Source Nasopharyngeal      SARS-CoV-2, Rapid Not detected NOTD     CT HEAD WO CONTRAST    Narrative    Head CT    INDICATION: Vertigo, nausea vomiting.    Multiple axial images obtained through the brain without intravenous contrast.   Radiation dose reduction techniques were used for this study:  All CT scans  performed at this facility use one or all of the following: Automated exposure  control, adjustment of the mA and/or kVp according to patient's size, iterative  reconstruction.    COMPARISON: None    FINDINGS: No areas of abnormal attenuation are seen in the brain. There is no CT  evidence of acute hemorrhage or infarction. The ventricles are normal in size.  There are no extra-axial fluid collections. No masses are seen. Left  mastoiditis. The sinuses are clear. There are no bony lesions.      Impression    Left mastoiditis.    No acute intraparenchymal hemorrhage or acute infarctions. No CT evidence of  acute intracranial abnormality.   CBC with Auto Differential   Result Value

## 2024-01-03 NOTE — CARE COORDINATION
1/3/24, 0900: CM received notification of need for ENT referral. Per review of records, referral was sent to [facility] on 1/2. CM placed call to [facility]; spoke with Matilde. Referral was received and they will reach out to patient to schedule appt.

## 2024-01-04 ENCOUNTER — OFFICE VISIT (OUTPATIENT)
Dept: ENT CLINIC | Age: 39
End: 2024-01-04
Payer: COMMERCIAL

## 2024-01-04 VITALS
BODY MASS INDEX: 34.55 KG/M2 | DIASTOLIC BLOOD PRESSURE: 82 MMHG | HEIGHT: 66 IN | WEIGHT: 215 LBS | SYSTOLIC BLOOD PRESSURE: 118 MMHG

## 2024-01-04 DIAGNOSIS — H69.92 ETD (EUSTACHIAN TUBE DYSFUNCTION), LEFT: Primary | ICD-10-CM

## 2024-01-04 DIAGNOSIS — R42 DISEQUILIBRIUM: ICD-10-CM

## 2024-01-04 DIAGNOSIS — R42 DIZZINESS: ICD-10-CM

## 2024-01-04 PROCEDURE — 99204 OFFICE O/P NEW MOD 45 MIN: CPT | Performed by: STUDENT IN AN ORGANIZED HEALTH CARE EDUCATION/TRAINING PROGRAM

## 2024-01-04 PROCEDURE — 92567 TYMPANOMETRY: CPT | Performed by: STUDENT IN AN ORGANIZED HEALTH CARE EDUCATION/TRAINING PROGRAM

## 2024-01-04 RX ORDER — BUSPIRONE HYDROCHLORIDE 15 MG/1
TABLET ORAL
COMMUNITY
Start: 2023-12-15

## 2024-01-04 RX ORDER — ESCITALOPRAM OXALATE 10 MG/1
15 TABLET ORAL DAILY
COMMUNITY
Start: 2023-12-11 | End: 2024-01-10

## 2024-01-04 RX ORDER — FLUTICASONE PROPIONATE 50 MCG
2 SPRAY, SUSPENSION (ML) NASAL DAILY
Qty: 2 EACH | Refills: 5 | Status: SHIPPED | OUTPATIENT
Start: 2024-01-04

## 2024-01-04 RX ORDER — CYCLOBENZAPRINE HCL 10 MG
5-10 TABLET ORAL 3 TIMES DAILY PRN
COMMUNITY
Start: 2023-12-11

## 2024-01-04 ASSESSMENT — ENCOUNTER SYMPTOMS
EYE REDNESS: 0
SHORTNESS OF BREATH: 0
VOMITING: 0
EYE ITCHING: 0
NAUSEA: 1

## 2024-01-04 NOTE — PROGRESS NOTES
clubbing or cyanosis.    Lab Results   Component Value Date/Time     01/02/2024 12:16 PM    K 4.0 01/02/2024 12:16 PM     01/02/2024 12:16 PM    CO2 27 01/02/2024 12:16 PM    BUN 15 01/02/2024 12:16 PM    CREATININE 0.86 01/02/2024 12:16 PM    GLUCOSE 140 01/02/2024 12:16 PM    CALCIUM 9.2 01/02/2024 12:16 PM    LABGLOM >60 01/02/2024 12:16 PM       Lab Results   Component Value Date    WBC 16.2 (H) 01/02/2024    HGB 13.6 01/02/2024    HCT 41.8 01/02/2024    MCV 91.1 01/02/2024     01/02/2024     CT Result (most recent):  CT HEAD WO CONTRAST 01/02/2024    Narrative  Head CT    INDICATION: Vertigo, nausea vomiting.    Multiple axial images obtained through the brain without intravenous contrast.  Radiation dose reduction techniques were used for this study:  All CT scans  performed at this facility use one or all of the following: Automated exposure  control, adjustment of the mA and/or kVp according to patient's size, iterative  reconstruction.    COMPARISON: None    FINDINGS: No areas of abnormal attenuation are seen in the brain. There is no CT  evidence of acute hemorrhage or infarction. The ventricles are normal in size.  There are no extra-axial fluid collections. No masses are seen. Left  mastoiditis. The sinuses are clear. There are no bony lesions.    Impression  Left mastoiditis.    No acute intraparenchymal hemorrhage or acute infarctions. No CT evidence of  acute intracranial abnormality.      ASSESSMENT and PLAN  Normal ear exam.  Normal type a tympanograms.  No mastoiditis on CT scan and only scant left mastoid simple effusion.  She describes oscillopsia and motion sickness.  I will send her to neurology.  I will also send her to [facility] therapy group.    ICD-10-CM    1. ETD (Eustachian tube dysfunction), left  H69.92 TYMPANOMETRY      2. Dizziness  R42 [facility] - Wade Dye ND, Neurology, [facility]     External Referral to Physical Therapy      3. Disequilibrium  R42 CECELIA Dye

## 2024-09-09 ENCOUNTER — OFFICE VISIT (OUTPATIENT)
Dept: OBGYN CLINIC | Age: 39
End: 2024-09-09
Payer: COMMERCIAL

## 2024-09-09 VITALS
HEIGHT: 66 IN | SYSTOLIC BLOOD PRESSURE: 118 MMHG | DIASTOLIC BLOOD PRESSURE: 66 MMHG | BODY MASS INDEX: 39.86 KG/M2 | WEIGHT: 248 LBS

## 2024-09-09 DIAGNOSIS — Z01.419 WOMEN'S ANNUAL ROUTINE GYNECOLOGICAL EXAMINATION: Primary | ICD-10-CM

## 2024-09-09 DIAGNOSIS — N92.6 IRREGULAR MENSES: ICD-10-CM

## 2024-09-09 DIAGNOSIS — Z72.0 VAPES NICOTINE CONTAINING SUBSTANCE: ICD-10-CM

## 2024-09-09 DIAGNOSIS — Z80.3 FAMILY HISTORY OF BREAST CANCER IN FEMALE: ICD-10-CM

## 2024-09-09 PROBLEM — Z84.81 FAMILY HISTORY OF BREAST CANCER GENE MUTATION IN FIRST DEGREE RELATIVE: Status: ACTIVE | Noted: 2024-09-09

## 2024-09-09 PROCEDURE — 99213 OFFICE O/P EST LOW 20 MIN: CPT | Performed by: OBSTETRICS & GYNECOLOGY

## 2024-09-09 PROCEDURE — 99459 PELVIC EXAMINATION: CPT | Performed by: OBSTETRICS & GYNECOLOGY

## 2024-09-09 PROCEDURE — 99406 BEHAV CHNG SMOKING 3-10 MIN: CPT | Performed by: OBSTETRICS & GYNECOLOGY

## 2024-09-09 PROCEDURE — 99395 PREV VISIT EST AGE 18-39: CPT | Performed by: OBSTETRICS & GYNECOLOGY

## 2024-09-09 RX ORDER — ESCITALOPRAM OXALATE 20 MG/1
TABLET ORAL
COMMUNITY
Start: 2024-08-22

## 2024-09-09 RX ORDER — MONTELUKAST SODIUM 10 MG/1
10 TABLET ORAL NIGHTLY
COMMUNITY
Start: 2024-04-26 | End: 2025-04-26

## 2024-09-09 RX ORDER — CETIRIZINE HYDROCHLORIDE 10 MG/1
10 TABLET ORAL DAILY
COMMUNITY
Start: 2024-05-23 | End: 2025-05-23

## 2024-09-09 RX ORDER — ACETAMINOPHEN AND CODEINE PHOSPHATE 120; 12 MG/5ML; MG/5ML
1 SOLUTION ORAL DAILY
Qty: 28 TABLET | Refills: 12 | Status: SHIPPED | OUTPATIENT
Start: 2024-09-09

## 2024-09-09 RX ORDER — HYDROXYZINE PAMOATE 25 MG/1
25 CAPSULE ORAL 3 TIMES DAILY PRN
COMMUNITY
Start: 2024-07-25 | End: 2025-01-21

## 2024-09-09 RX ORDER — DEXTROAMPHETAMINE SACCHARATE, AMPHETAMINE ASPARTATE MONOHYDRATE, DEXTROAMPHETAMINE SULFATE AND AMPHETAMINE SULFATE 3.75; 3.75; 3.75; 3.75 MG/1; MG/1; MG/1; MG/1
15 CAPSULE, EXTENDED RELEASE ORAL EVERY MORNING
COMMUNITY
Start: 2024-08-22 | End: 2024-11-20

## 2024-09-09 RX ORDER — OFATUMUMAB 20 MG/.4ML
INJECTION, SOLUTION SUBCUTANEOUS
COMMUNITY
Start: 2024-06-13

## 2024-09-09 RX ORDER — ONDANSETRON 4 MG/1
TABLET, ORALLY DISINTEGRATING ORAL
COMMUNITY
Start: 2024-07-25

## 2024-09-09 RX ORDER — BUSPIRONE HYDROCHLORIDE 30 MG/1
30 TABLET ORAL 2 TIMES DAILY
COMMUNITY
Start: 2024-08-22

## 2024-09-30 RX ORDER — ACETAMINOPHEN AND CODEINE PHOSPHATE 120; 12 MG/5ML; MG/5ML
1 SOLUTION ORAL DAILY
Qty: 84 TABLET | Refills: 5 | OUTPATIENT
Start: 2024-09-30

## 2024-10-09 PROBLEM — Z01.419 WOMEN'S ANNUAL ROUTINE GYNECOLOGICAL EXAMINATION: Status: RESOLVED | Noted: 2023-09-07 | Resolved: 2024-10-09

## 2024-10-21 ENCOUNTER — PROCEDURE VISIT (OUTPATIENT)
Dept: OBGYN CLINIC | Age: 39
End: 2024-10-21
Payer: COMMERCIAL

## 2024-10-21 ENCOUNTER — OFFICE VISIT (OUTPATIENT)
Dept: OBGYN CLINIC | Age: 39
End: 2024-10-21
Payer: COMMERCIAL

## 2024-10-21 VITALS
SYSTOLIC BLOOD PRESSURE: 112 MMHG | BODY MASS INDEX: 37.93 KG/M2 | WEIGHT: 236 LBS | HEIGHT: 66 IN | DIASTOLIC BLOOD PRESSURE: 64 MMHG

## 2024-10-21 DIAGNOSIS — Z72.0 VAPES NICOTINE CONTAINING SUBSTANCE: ICD-10-CM

## 2024-10-21 DIAGNOSIS — N92.6 IRREGULAR MENSES: Primary | ICD-10-CM

## 2024-10-21 DIAGNOSIS — N94.6 DYSMENORRHEA: ICD-10-CM

## 2024-10-21 DIAGNOSIS — N83.201 CYST OF RIGHT OVARY: ICD-10-CM

## 2024-10-21 DIAGNOSIS — R30.0 DYSURIA: ICD-10-CM

## 2024-10-21 LAB
BILIRUBIN, URINE, POC: NEGATIVE
BLOOD URINE, POC: NEGATIVE
GLUCOSE URINE, POC: NEGATIVE
KETONES, URINE, POC: NEGATIVE
LEUKOCYTE ESTERASE, URINE, POC: NEGATIVE
NITRITE, URINE, POC: NEGATIVE
PH, URINE, POC: 5.5 (ref 4.6–8)
PROTEIN,URINE, POC: NEGATIVE
SPECIFIC GRAVITY, URINE, POC: 1.02 (ref 1–1.03)
URINALYSIS CLARITY, POC: CLEAR
URINALYSIS COLOR, POC: YELLOW
UROBILINOGEN, POC: NORMAL MG/DL

## 2024-10-21 PROCEDURE — 99406 BEHAV CHNG SMOKING 3-10 MIN: CPT | Performed by: OBSTETRICS & GYNECOLOGY

## 2024-10-21 PROCEDURE — 76830 TRANSVAGINAL US NON-OB: CPT | Performed by: OBSTETRICS & GYNECOLOGY

## 2024-10-21 PROCEDURE — 99214 OFFICE O/P EST MOD 30 MIN: CPT | Performed by: OBSTETRICS & GYNECOLOGY

## 2024-10-21 PROCEDURE — 81002 URINALYSIS NONAUTO W/O SCOPE: CPT | Performed by: OBSTETRICS & GYNECOLOGY

## 2024-10-21 RX ORDER — ALUMINUM HYDROXIDE, MAGNESIUM HYDROXIDE, SIMETHICONE 400; 400; 40 MG/10ML; MG/10ML; MG/10ML
SUSPENSION ORAL
COMMUNITY
Start: 2024-09-20 | End: 2024-10-21

## 2024-10-21 NOTE — ASSESSMENT & PLAN NOTE
Encouraged pt to start taking ibuprofen about 1-2 days before menses begins to decrease bleeding and cramping by potentially 50%.  D/w pt that there may be a foreign body component.  If persists, may consider IUD removal   palpitations

## 2024-10-21 NOTE — ASSESSMENT & PLAN NOTE
D/W pt at length neg effects of tobacco abuse including but not limited to: death, multiple forms of CA, COPD, CAD, vascular damage, etc.  Encouraged cessation and D/W her methods (tapering, counseling, nicotine patches/gums, etc.)  Approximately 4 minutes spent in face to face counseling

## 2024-10-21 NOTE — ASSESSMENT & PLAN NOTE
Us ordered today, reviewed and D/W pt in detail -- essentially wnl, IUD in place  She notes that the POP's seem to have helped the bleeding already but the cramping still present  Will cont micronor

## 2024-10-21 NOTE — PROGRESS NOTES
Patent here for f/u with US.   Patient states that she is late on her menses, had some spotting this month, and has had increased pelvic cramping/pain since starting POPs.      Patient also is requesting UA due to feeling dysuria and urinary frequency that started yesterday.     LAST PAP:  3/9/2022, neg., HPV neg.     LAST MAMMO:  never     LMP:  Patient's last menstrual period was 09/20/2024 (exact date).    BIRTH CONTROL:  IUD and POPs     TOBACCO USE:  Yes-vapes     FAMILY HISTORY OF:   Breast Cancer:  Yes   Ovarian Cancer:  No   Uterine Cancer:  No   Colon Cancer:  No    Vitals:    10/21/24 1115   BP: 112/64   Site: Left Upper Arm   Position: Sitting   Weight: 107 kg (236 lb)   Height: 1.676 m (5' 6\")        FLORENTINO GLOVER RN  10/21/24  11:23 AM    
patient's  preferred pharmacy.      Hypothyroidism     in  was on synthroid was told it was stress induced     MS (multiple sclerosis) (HCC)     Polycystic disease, ovaries     In past (resolved per patient)     delivery     Vaginal bleeding during pregnancy 2022    Vaginal discharge during pregnancy 2022            Past Surgical History:   Procedure Laterality Date     SECTION      CHOLECYSTECTOMY      IR CHOLECYSTOSTOMY PERCUTANEOUS COMPLETE      REVISION CERVIX W PREG,VAG APPRCH                 Family History   Problem Relation Age of Onset    Mult Sclerosis Paternal Grandmother     Cancer Maternal Grandmother     Diabetes Father     Stroke Mother     Diabetes Mother     Migraines Mother     Diabetes Brother     Breast Cancer Sister     Ovarian Cancer Neg Hx     Uterine Cancer Neg Hx     Colon Cancer Neg Hx            Social History     Socioeconomic History    Marital status:      Spouse name: Not on file    Number of children: Not on file    Years of education: Not on file    Highest education level: Not on file   Occupational History    Not on file   Tobacco Use    Smoking status: Every Day     Current packs/day: 0.25     Average packs/day: 0.3 packs/day for 15.0 years (3.8 ttl pk-yrs)     Types: E-Cigarettes, Cigarettes    Smokeless tobacco: Never    Tobacco comments:     Vape   Vaping Use    Vaping status: Every Day   Substance and Sexual Activity    Alcohol use: Not Currently    Drug use: Not Currently    Sexual activity: Yes     Partners: Male     Birth control/protection: None   Other Topics Concern    Not on file   Social History Narrative    Abuse: Feels safe at home, no history of physical abuse, no history of sexual abuse      Social Determinants of Health     Financial Resource Strain: Low Risk  (2024)    Received from [facility]    Overall Financial Resource Strain (CARDIA)     Difficulty of Paying Living Expenses: Not very hard   Food

## 2024-10-21 NOTE — PATIENT INSTRUCTIONS
STOP VAPING!  Encouraged pt to start taking ibuprofen about 1-2 days before menses begins to decrease bleeding and cramping by potentially 50%.   If things aren't better in the next 3-4 months, come back and may be we will remove your IUD  Thanks for coming to see us today and letting us take care of you!

## 2025-02-21 RX ORDER — FLUTICASONE PROPIONATE 50 MCG
2 SPRAY, SUSPENSION (ML) NASAL DAILY
Qty: 3 EACH | Refills: 5 | Status: SHIPPED | OUTPATIENT
Start: 2025-02-21

## (undated) DEVICE — LITHOTOMY: Brand: MEDLINE INDUSTRIES, INC.

## (undated) DEVICE — GOWN,REINF,POLY,ECL,PP SLV,XL: Brand: MEDLINE

## (undated) DEVICE — SUTURE PROL 2 L60IN NONABSORBABLE BLU TP 1 L65MM 1 2 CIR 8825G

## (undated) DEVICE — PREMIUM WET SKIN PREP TRAY: Brand: MEDLINE INDUSTRIES, INC.

## (undated) DEVICE — SOLUTION IRRIG 1000ML H2O STRL BLT

## (undated) DEVICE — X-RAY SPONGES,12 PLY: Brand: DERMACEA

## (undated) DEVICE — STERILE POLYISOPRENE POWDER-FREE SURGICAL GLOVES: Brand: PROTEXIS

## (undated) DEVICE — Z INACTIVE USE 2527070 DRAPE SURG W40XL44IN UNDERBUTTOCK SMS POLYPR W/ PCH BK DISP

## (undated) DEVICE — DRAPE TWL SURG 16X26IN BLU ORB04] ALLCARE INC]